# Patient Record
Sex: MALE | Race: WHITE | NOT HISPANIC OR LATINO
[De-identification: names, ages, dates, MRNs, and addresses within clinical notes are randomized per-mention and may not be internally consistent; named-entity substitution may affect disease eponyms.]

---

## 2017-03-20 ENCOUNTER — RESULT REVIEW (OUTPATIENT)
Age: 82
End: 2017-03-20

## 2017-03-20 VITALS
RESPIRATION RATE: 22 BRPM | HEART RATE: 82 BPM | SYSTOLIC BLOOD PRESSURE: 140 MMHG | DIASTOLIC BLOOD PRESSURE: 56 MMHG | OXYGEN SATURATION: 97 %

## 2017-03-20 RX ORDER — ACETAMINOPHEN 500 MG
1000 TABLET ORAL ONCE
Qty: 0 | Refills: 0 | Status: COMPLETED | OUTPATIENT
Start: 2017-03-20 | End: 2017-03-20

## 2017-03-20 RX ADMIN — Medication 400 MILLIGRAM(S): at 23:51

## 2017-03-20 NOTE — ED PROVIDER NOTE - ATTENDING CONTRIBUTION TO CARE
89 yo male with RLQ abdominal pain x several hours.  No prior appendectomy.  RLQ TTP on my exam.  Will CT abdomen/pelvis, r/o appy and reassess.

## 2017-03-20 NOTE — ED PROVIDER NOTE - OBJECTIVE STATEMENT
89M HLD presents with right lower abdominal pains ("gentle", non-radiating), vomiting, chills after eating whitefish salad. Denies chest pain, shortness of breath, cough, bowel/bladder changes, sick contacts, abd surgeries. No one else ate the salad.

## 2017-03-20 NOTE — ED ADULT NURSE NOTE - OBJECTIVE STATEMENT
Patient presented to ED via ambulance from home c/o RLQ abdominal pain and vomiting x 2 , Patient was not feeling good for 2/3 days and tonight at 2030 pm patient vomited. Family at bedside. Patient has some temperature, IV tylenol given as ordered.

## 2017-03-20 NOTE — ED PROVIDER NOTE - INTERPRETATION
EKG reviewed for rate, rhythm, axis, intervals and segments, including QRS morphology, P wave appearance T wave appearance, NH interval, and QT interval.  I find the EKG to be unremarkable in all of these regards except as follows: L axis, PACs

## 2017-03-20 NOTE — ED PROVIDER NOTE - MEDICAL DECISION MAKING DETAILS
89M presents with RLQ pain concerning for possible appy vs gastroenteritis. Ate salad that may have caused the illness, but tender in the RLQ. Will obtain blood, urine, ecg, ctap, cxr. Ben Saldana, Resident.

## 2017-03-21 ENCOUNTER — TRANSCRIPTION ENCOUNTER (OUTPATIENT)
Age: 82
End: 2017-03-21

## 2017-03-21 ENCOUNTER — OUTPATIENT (OUTPATIENT)
Dept: EMERGENCY DEPT | Facility: HOSPITAL | Age: 82
LOS: 1 days | Discharge: HOME | End: 2017-03-21
Payer: MEDICARE

## 2017-03-21 VITALS
OXYGEN SATURATION: 98 % | DIASTOLIC BLOOD PRESSURE: 59 MMHG | HEART RATE: 84 BPM | RESPIRATION RATE: 16 BRPM | SYSTOLIC BLOOD PRESSURE: 111 MMHG

## 2017-03-21 DIAGNOSIS — R10.31 RIGHT LOWER QUADRANT PAIN: ICD-10-CM

## 2017-03-21 DIAGNOSIS — Z90.89 ACQUIRED ABSENCE OF OTHER ORGANS: Chronic | ICD-10-CM

## 2017-03-21 DIAGNOSIS — K35.80 UNSPECIFIED ACUTE APPENDICITIS: ICD-10-CM

## 2017-03-21 LAB
BLD GP AB SCN SERPL QL: NEGATIVE — SIGNIFICANT CHANGE UP
RH IG SCN BLD-IMP: POSITIVE — SIGNIFICANT CHANGE UP

## 2017-03-21 PROCEDURE — 84484 ASSAY OF TROPONIN QUANT: CPT

## 2017-03-21 PROCEDURE — 84295 ASSAY OF SERUM SODIUM: CPT

## 2017-03-21 PROCEDURE — 86850 RBC ANTIBODY SCREEN: CPT

## 2017-03-21 PROCEDURE — 84100 ASSAY OF PHOSPHORUS: CPT

## 2017-03-21 PROCEDURE — 81001 URINALYSIS AUTO W/SCOPE: CPT

## 2017-03-21 PROCEDURE — 80053 COMPREHEN METABOLIC PANEL: CPT

## 2017-03-21 PROCEDURE — 80048 BASIC METABOLIC PNL TOTAL CA: CPT

## 2017-03-21 PROCEDURE — 85014 HEMATOCRIT: CPT

## 2017-03-21 PROCEDURE — 87040 BLOOD CULTURE FOR BACTERIA: CPT

## 2017-03-21 PROCEDURE — 71010: CPT | Mod: 26

## 2017-03-21 PROCEDURE — 74177 CT ABD & PELVIS W/CONTRAST: CPT | Mod: 26

## 2017-03-21 PROCEDURE — 82803 BLOOD GASES ANY COMBINATION: CPT

## 2017-03-21 PROCEDURE — 85730 THROMBOPLASTIN TIME PARTIAL: CPT

## 2017-03-21 PROCEDURE — 88304 TISSUE EXAM BY PATHOLOGIST: CPT

## 2017-03-21 PROCEDURE — 85610 PROTHROMBIN TIME: CPT

## 2017-03-21 PROCEDURE — 87086 URINE CULTURE/COLONY COUNT: CPT

## 2017-03-21 PROCEDURE — 74177 CT ABD & PELVIS W/CONTRAST: CPT

## 2017-03-21 PROCEDURE — 88304 TISSUE EXAM BY PATHOLOGIST: CPT | Mod: 26

## 2017-03-21 PROCEDURE — 83605 ASSAY OF LACTIC ACID: CPT

## 2017-03-21 PROCEDURE — 86901 BLOOD TYPING SEROLOGIC RH(D): CPT

## 2017-03-21 PROCEDURE — 83735 ASSAY OF MAGNESIUM: CPT

## 2017-03-21 PROCEDURE — 82947 ASSAY GLUCOSE BLOOD QUANT: CPT

## 2017-03-21 PROCEDURE — 71045 X-RAY EXAM CHEST 1 VIEW: CPT

## 2017-03-21 PROCEDURE — 85027 COMPLETE CBC AUTOMATED: CPT

## 2017-03-21 PROCEDURE — 83690 ASSAY OF LIPASE: CPT

## 2017-03-21 PROCEDURE — 82435 ASSAY OF BLOOD CHLORIDE: CPT

## 2017-03-21 PROCEDURE — 82330 ASSAY OF CALCIUM: CPT

## 2017-03-21 PROCEDURE — 44970 LAPAROSCOPY APPENDECTOMY: CPT

## 2017-03-21 PROCEDURE — 86900 BLOOD TYPING SEROLOGIC ABO: CPT

## 2017-03-21 PROCEDURE — 84132 ASSAY OF SERUM POTASSIUM: CPT

## 2017-03-21 RX ORDER — HYDROMORPHONE HYDROCHLORIDE 2 MG/ML
0.5 INJECTION INTRAMUSCULAR; INTRAVENOUS; SUBCUTANEOUS EVERY 6 HOURS
Qty: 0 | Refills: 0 | Status: DISCONTINUED | OUTPATIENT
Start: 2017-03-21 | End: 2017-03-21

## 2017-03-21 RX ORDER — HYDROMORPHONE HYDROCHLORIDE 2 MG/ML
1 INJECTION INTRAMUSCULAR; INTRAVENOUS; SUBCUTANEOUS EVERY 8 HOURS
Qty: 0 | Refills: 0 | Status: DISCONTINUED | OUTPATIENT
Start: 2017-03-21 | End: 2017-03-21

## 2017-03-21 RX ORDER — SODIUM CHLORIDE 9 MG/ML
1000 INJECTION, SOLUTION INTRAVENOUS
Qty: 0 | Refills: 0 | Status: DISCONTINUED | OUTPATIENT
Start: 2017-03-21 | End: 2017-03-21

## 2017-03-21 RX ORDER — ACETAMINOPHEN 500 MG
650 TABLET ORAL EVERY 6 HOURS
Qty: 0 | Refills: 0 | Status: DISCONTINUED | OUTPATIENT
Start: 2017-03-21 | End: 2017-03-21

## 2017-03-21 RX ORDER — ENOXAPARIN SODIUM 100 MG/ML
30 INJECTION SUBCUTANEOUS DAILY
Qty: 0 | Refills: 0 | Status: DISCONTINUED | OUTPATIENT
Start: 2017-03-21 | End: 2017-03-21

## 2017-03-21 RX ORDER — ACETAMINOPHEN 500 MG
2 TABLET ORAL
Qty: 0 | Refills: 0 | DISCHARGE
Start: 2017-03-21

## 2017-03-21 RX ORDER — FENTANYL CITRATE 50 UG/ML
25 INJECTION INTRAVENOUS
Qty: 0 | Refills: 0 | Status: DISCONTINUED | OUTPATIENT
Start: 2017-03-21 | End: 2017-03-21

## 2017-03-21 RX ORDER — CEFOTETAN DISODIUM 1 G
2 VIAL (EA) INJECTION ONCE
Qty: 0 | Refills: 0 | Status: COMPLETED | OUTPATIENT
Start: 2017-03-21 | End: 2017-03-21

## 2017-03-21 RX ORDER — SODIUM CHLORIDE 9 MG/ML
1000 INJECTION INTRAMUSCULAR; INTRAVENOUS; SUBCUTANEOUS ONCE
Qty: 0 | Refills: 0 | Status: COMPLETED | OUTPATIENT
Start: 2017-03-21 | End: 2017-03-21

## 2017-03-21 RX ORDER — CEFOTETAN DISODIUM 1 G
2 VIAL (EA) INJECTION EVERY 12 HOURS
Qty: 0 | Refills: 0 | Status: DISCONTINUED | OUTPATIENT
Start: 2017-03-21 | End: 2017-03-21

## 2017-03-21 RX ORDER — ATORVASTATIN CALCIUM 80 MG/1
1 TABLET, FILM COATED ORAL
Qty: 0 | Refills: 0 | DISCHARGE
Start: 2017-03-21

## 2017-03-21 RX ORDER — SERTRALINE 25 MG/1
1 TABLET, FILM COATED ORAL
Qty: 0 | Refills: 0 | DISCHARGE
Start: 2017-03-21

## 2017-03-21 RX ORDER — ONDANSETRON 8 MG/1
4 TABLET, FILM COATED ORAL ONCE
Qty: 0 | Refills: 0 | Status: DISCONTINUED | OUTPATIENT
Start: 2017-03-21 | End: 2017-03-21

## 2017-03-21 RX ADMIN — SODIUM CHLORIDE 1000 MILLILITER(S): 9 INJECTION INTRAMUSCULAR; INTRAVENOUS; SUBCUTANEOUS at 05:10

## 2017-03-21 RX ADMIN — Medication 100 GRAM(S): at 04:29

## 2017-03-21 RX ADMIN — Medication 1000 MILLIGRAM(S): at 01:32

## 2017-03-21 RX ADMIN — ENOXAPARIN SODIUM 30 MILLIGRAM(S): 100 INJECTION SUBCUTANEOUS at 13:51

## 2017-03-21 RX ADMIN — SODIUM CHLORIDE 50 MILLILITER(S): 9 INJECTION, SOLUTION INTRAVENOUS at 11:05

## 2017-03-21 RX ADMIN — SODIUM CHLORIDE 2000 MILLILITER(S): 9 INJECTION INTRAMUSCULAR; INTRAVENOUS; SUBCUTANEOUS at 02:09

## 2017-03-21 RX ADMIN — SODIUM CHLORIDE 2000 MILLILITER(S): 9 INJECTION INTRAMUSCULAR; INTRAVENOUS; SUBCUTANEOUS at 01:32

## 2017-03-21 NOTE — DISCHARGE NOTE ADULT - PLAN OF CARE
post op care regular diet as tolerated, no heavy lifting or straining, may shower Please pat steri strips dry after showering, follow up with Dr. Early in 1 week     notify Dr. Early if develop fever, chills, nausea, vomiting increased abdominal pain

## 2017-03-21 NOTE — DISCHARGE NOTE ADULT - MEDICATION SUMMARY - MEDICATIONS TO TAKE
I will START or STAY ON the medications listed below when I get home from the hospital:    acetaminophen 325 mg oral tablet  -- 2 tab(s) by mouth every 6 hours, As needed, Mild Pain (1 - 3)  -- Indication: For post op pain     sertraline 50 mg oral tablet  -- 1 tab(s) by mouth once a day  -- Indication: For depression     atorvastatin 10 mg oral tablet  -- 1 tab(s) by mouth once a day  -- Indication: For high cholesterol

## 2017-03-21 NOTE — DISCHARGE NOTE ADULT - CARE PROVIDER_API CALL
Ben Early), Surgery; Surgical Critical Care  [address] [place] [id number]  Phone: [phone]  Fax: [phone]

## 2017-03-21 NOTE — DISCHARGE NOTE ADULT - CARE PROVIDERS DIRECT ADDRESSES
[email or web address]

## 2017-03-21 NOTE — DISCHARGE NOTE ADULT - NS AS ACTIVITY OBS
Showering allowed/Do not make important decisions/No Heavy lifting/straining/Do not drive or operate machinery

## 2017-03-21 NOTE — DISCHARGE NOTE ADULT - CARE PLAN
Principal Discharge DX:	Acute appendicitis, unspecified acute appendicitis type  Goal:	post op care  Instructions for follow-up, activity and diet:	regular diet as tolerated, no heavy lifting or straining, may shower Please pat steri strips dry after showering, follow up with Dr. Early in 1 week     notify Dr. Early if develop fever, chills, nausea, vomiting increased abdominal pain

## 2017-03-21 NOTE — DISCHARGE NOTE ADULT - PATIENT PORTAL LINK FT
“You can access the [facility] Patient Portal, offered by [facility], by registering with the following website: [email or web address]”

## 2017-03-21 NOTE — ED ADULT NURSE REASSESSMENT NOTE - NS ED NURSE REASSESS COMMENT FT1
Ct scan + appendicitis. T&S obtained and sent. Blood cultures sent. Patient voided twice, small amounts. Patient calm A+Ox3. skin intact.

## 2017-03-21 NOTE — H&P ADULT. - HISTORY OF PRESENT ILLNESS
89M with 1 day of abdominal pain. His pain is in RLQ, associated with emesis and chills. Pain came on around 11pm. No fever, obstructive symptoms. He initially thought he had gastritis or food poisoning from eating something bad. Anorexia. His initial SBP was 140 but went down to SBP 80, which responded to IVF and went back up to 100's. Afebrile. He is tender in suprapubic and RLQ area with focal guarding. He doesn't know the doses of his medications but completely AOx3.

## 2017-03-21 NOTE — BRIEF OPERATIVE NOTE - OPERATION/FINDINGS
Procedure: Laparoscopic appendectomy    Findings: A supraumbilical incision was made and carried down through the fascia. The abdomen was entered and insufflated. Additional trocars were placed in the RUQ and LLQ. The mesoappendix was ligated and the appendix was stapled off and removed. The fascia and skin was then closed.

## 2017-03-21 NOTE — DISCHARGE NOTE ADULT - HOSPITAL COURSE
89M with 1 day of abdominal pain. His pain is in RLQ, associated with emesis and chills. Pain came on around 11pm. No fever, obstructive symptoms. He initially thought he had gastritis or food poisoning from eating something bad. Anorexia. His initial SBP was 140 but went down to SBP 80, which responded to IVF and went back up to 100's. Afebrile. He is tender in suprapubic and RLQ area with focal guarding. 89M with 1 day of abdominal pain. His pain is in RLQ, associated with emesis and chills. Pain came on around 11pm. No fever, obstructive symptoms. He initially thought he had gastritis or food poisoning from eating something bad. Anorexia. His initial SBP was 140 but went down to SBP 80, which responded to IVF and went back up to 100's. Afebrile. He is tender in suprapubic and RLQ area with focal guarding.  CT Acute appendicitis. No periappendiceal abscess or evidence of perforation. pt taken to operating room underwent lap appendectomy. Post op did well Pt ambulating, voiding, tolerating diet on discharge to follow up Dr. Eraly 89M with 1 day of abdominal pain. His pain is in RLQ, associated with emesis and chills. Pain came on around 11pm. No fever, obstructive symptoms. He initially thought he had gastritis or food poisoning from eating something bad. Anorexia. His initial SBP was 140 but went down to SBP 80, which responded to IVF and went back up to 100's. Afebrile. He is tender in suprapubic and RLQ area with focal guarding.  CT Acute appendicitis. No periappendiceal abscess or evidence of perforation. pt taken to operating room underwent lap appendectomy. Post op did well Pt ambulating, voiding, tolerating diet on discharge to follow up Dr. Eraly. pt does not want to take any narcotics and wishes just to take tylenol for pain

## 2017-03-24 RX ORDER — SERTRALINE 25 MG/1
0 TABLET, FILM COATED ORAL
Qty: 0 | Refills: 0 | COMMUNITY

## 2017-03-24 RX ORDER — ATORVASTATIN CALCIUM 80 MG/1
0 TABLET, FILM COATED ORAL
Qty: 0 | Refills: 0 | COMMUNITY

## 2017-03-28 PROBLEM — F41.9 ANXIETY DISORDER, UNSPECIFIED: Chronic | Status: ACTIVE | Noted: 2017-03-21

## 2017-03-30 ENCOUNTER — APPOINTMENT (OUTPATIENT)
Dept: TRAUMA SURGERY | Facility: CLINIC | Age: 82
End: 2017-03-30

## 2017-04-06 ENCOUNTER — APPOINTMENT (OUTPATIENT)
Dept: TRAUMA SURGERY | Facility: CLINIC | Age: 82
End: 2017-04-06

## 2017-04-06 VITALS
HEIGHT: 61 IN | TEMPERATURE: 98 F | HEART RATE: 111 BPM | SYSTOLIC BLOOD PRESSURE: 109 MMHG | WEIGHT: 125 LBS | BODY MASS INDEX: 23.6 KG/M2 | DIASTOLIC BLOOD PRESSURE: 67 MMHG

## 2018-02-12 ENCOUNTER — APPOINTMENT (OUTPATIENT)
Dept: GERIATRICS | Facility: CLINIC | Age: 83
End: 2018-02-12
Payer: MEDICARE

## 2018-02-12 ENCOUNTER — LABORATORY RESULT (OUTPATIENT)
Age: 83
End: 2018-02-12

## 2018-02-12 VITALS
HEART RATE: 96 BPM | WEIGHT: 129 LBS | TEMPERATURE: 98.3 F | HEIGHT: 61 IN | DIASTOLIC BLOOD PRESSURE: 60 MMHG | BODY MASS INDEX: 24.35 KG/M2 | SYSTOLIC BLOOD PRESSURE: 108 MMHG | OXYGEN SATURATION: 97 %

## 2018-02-12 DIAGNOSIS — E78.5 HYPERLIPIDEMIA, UNSPECIFIED: ICD-10-CM

## 2018-02-12 DIAGNOSIS — Z00.00 ENCOUNTER FOR GENERAL ADULT MEDICAL EXAMINATION W/OUT ABNORMAL FINDINGS: ICD-10-CM

## 2018-02-12 DIAGNOSIS — Z86.59 PERSONAL HISTORY OF OTHER MENTAL AND BEHAVIORAL DISORDERS: ICD-10-CM

## 2018-02-12 LAB
ALBUMIN SERPL ELPH-MCNC: 4.3 G/DL
ALP BLD-CCNC: 98 U/L
ALT SERPL-CCNC: 16 U/L
ANION GAP SERPL CALC-SCNC: 13 MMOL/L
AST SERPL-CCNC: 20 U/L
BASOPHILS # BLD AUTO: 0.01 K/UL
BASOPHILS NFR BLD AUTO: 0.2 %
BILIRUB SERPL-MCNC: 0.5 MG/DL
BUN SERPL-MCNC: 23 MG/DL
CALCIUM SERPL-MCNC: 9.2 MG/DL
CHLORIDE SERPL-SCNC: 103 MMOL/L
CHOLEST SERPL-MCNC: 142 MG/DL
CHOLEST/HDLC SERPL: 3.5 RATIO
CO2 SERPL-SCNC: 26 MMOL/L
CREAT SERPL-MCNC: 0.88 MG/DL
EOSINOPHIL # BLD AUTO: 0.07 K/UL
EOSINOPHIL NFR BLD AUTO: 1.1 %
GLUCOSE SERPL-MCNC: 103 MG/DL
HCT VFR BLD CALC: 39.6 %
HDLC SERPL-MCNC: 41 MG/DL
HGB BLD-MCNC: 13 G/DL
IMM GRANULOCYTES NFR BLD AUTO: 0.2 %
LDLC SERPL CALC-MCNC: 57 MG/DL
LYMPHOCYTES # BLD AUTO: 1.07 K/UL
LYMPHOCYTES NFR BLD AUTO: 17.5 %
MAN DIFF?: NORMAL
MCHC RBC-ENTMCNC: 31.1 PG
MCHC RBC-ENTMCNC: 32.8 GM/DL
MCV RBC AUTO: 94.7 FL
MONOCYTES # BLD AUTO: 0.86 K/UL
MONOCYTES NFR BLD AUTO: 14.1 %
NEUTROPHILS # BLD AUTO: 4.09 K/UL
NEUTROPHILS NFR BLD AUTO: 66.9 %
PLATELET # BLD AUTO: 221 K/UL
POTASSIUM SERPL-SCNC: 4.5 MMOL/L
PROT SERPL-MCNC: 6.9 G/DL
RBC # BLD: 4.18 M/UL
RBC # FLD: 13.2 %
SODIUM SERPL-SCNC: 142 MMOL/L
TRIGL SERPL-MCNC: 221 MG/DL
WBC # FLD AUTO: 6.11 K/UL

## 2018-02-12 PROCEDURE — 99204 OFFICE O/P NEW MOD 45 MIN: CPT | Mod: 25

## 2018-02-12 PROCEDURE — 99497 ADVNCD CARE PLAN 30 MIN: CPT

## 2018-02-13 DIAGNOSIS — Z85.46 PERSONAL HISTORY OF MALIGNANT NEOPLASM OF PROSTATE: ICD-10-CM

## 2018-02-13 DIAGNOSIS — N32.81 OVERACTIVE BLADDER: ICD-10-CM

## 2018-02-13 DIAGNOSIS — Z85.828 PERSONAL HISTORY OF OTHER MALIGNANT NEOPLASM OF SKIN: ICD-10-CM

## 2018-02-13 DIAGNOSIS — H91.93 UNSPECIFIED HEARING LOSS, BILATERAL: ICD-10-CM

## 2018-02-13 LAB
25(OH)D3 SERPL-MCNC: 40.2 NG/ML
FOLATE SERPL-MCNC: >20 NG/ML
TSH SERPL-ACNC: 5.24 UIU/ML
VIT B12 SERPL-MCNC: 924 PG/ML

## 2018-03-02 PROBLEM — H91.93 BILATERAL HEARING LOSS, UNSPECIFIED HEARING LOSS TYPE: Status: ACTIVE | Noted: 2018-03-02

## 2018-03-02 PROBLEM — Z85.828 HISTORY OF MALIGNANT NEOPLASM OF SKIN: Status: RESOLVED | Noted: 2018-03-02 | Resolved: 2018-03-02

## 2018-03-02 PROBLEM — N32.81 OVERACTIVE BLADDER: Status: ACTIVE | Noted: 2018-03-02

## 2018-03-02 PROBLEM — Z85.46 HISTORY OF MALIGNANT NEOPLASM OF PROSTATE: Status: RESOLVED | Noted: 2018-03-02 | Resolved: 2018-03-02

## 2018-03-26 ENCOUNTER — APPOINTMENT (OUTPATIENT)
Dept: RADIOLOGY | Facility: HOSPITAL | Age: 83
End: 2018-03-26
Payer: MEDICARE

## 2018-03-26 ENCOUNTER — OUTPATIENT (OUTPATIENT)
Dept: OUTPATIENT SERVICES | Facility: HOSPITAL | Age: 83
LOS: 1 days | End: 2018-03-26

## 2018-03-26 DIAGNOSIS — Z90.89 ACQUIRED ABSENCE OF OTHER ORGANS: Chronic | ICD-10-CM

## 2018-03-26 DIAGNOSIS — R13.10 DYSPHAGIA, UNSPECIFIED: ICD-10-CM

## 2018-03-26 PROCEDURE — 74220 X-RAY XM ESOPHAGUS 1CNTRST: CPT | Mod: 26

## 2018-12-26 ENCOUNTER — APPOINTMENT (OUTPATIENT)
Dept: GERIATRICS | Facility: CLINIC | Age: 83
End: 2018-12-26
Payer: MEDICARE

## 2018-12-26 ENCOUNTER — LABORATORY RESULT (OUTPATIENT)
Age: 83
End: 2018-12-26

## 2018-12-26 VITALS
BODY MASS INDEX: 26.08 KG/M2 | OXYGEN SATURATION: 98 % | TEMPERATURE: 98.1 F | WEIGHT: 138 LBS | HEART RATE: 82 BPM | DIASTOLIC BLOOD PRESSURE: 50 MMHG | SYSTOLIC BLOOD PRESSURE: 100 MMHG

## 2018-12-26 DIAGNOSIS — Z09 ENCOUNTER FOR FOLLOW-UP EXAMINATION AFTER COMPLETED TREATMENT FOR CONDITIONS OTHER THAN MALIGNANT NEOPLASM: ICD-10-CM

## 2018-12-26 PROCEDURE — G0008: CPT

## 2018-12-26 PROCEDURE — 90662 IIV NO PRSV INCREASED AG IM: CPT

## 2018-12-26 PROCEDURE — 99215 OFFICE O/P EST HI 40 MIN: CPT | Mod: 25

## 2018-12-26 RX ORDER — MULTIVIT-MIN/FOLIC/VIT K/LYCOP 400-300MCG
25 MCG TABLET ORAL DAILY
Qty: 30 | Refills: 3 | Status: DISCONTINUED | OUTPATIENT
Start: 2018-02-12 | End: 2018-12-26

## 2018-12-27 LAB
ALBUMIN SERPL ELPH-MCNC: 4.1 G/DL
ALP BLD-CCNC: 91 U/L
ALT SERPL-CCNC: 14 U/L
ANION GAP SERPL CALC-SCNC: 10 MMOL/L
AST SERPL-CCNC: 20 U/L
BILIRUB SERPL-MCNC: 0.4 MG/DL
BUN SERPL-MCNC: 26 MG/DL
CALCIUM SERPL-MCNC: 9.3 MG/DL
CHLORIDE SERPL-SCNC: 105 MMOL/L
CHOLEST SERPL-MCNC: 138 MG/DL
CHOLEST/HDLC SERPL: 3.4 RATIO
CO2 SERPL-SCNC: 26 MMOL/L
CREAT SERPL-MCNC: 1.07 MG/DL
GLUCOSE SERPL-MCNC: 116 MG/DL
HDLC SERPL-MCNC: 41 MG/DL
LDLC SERPL CALC-MCNC: 51 MG/DL
POTASSIUM SERPL-SCNC: 4.2 MMOL/L
PROT SERPL-MCNC: 6.7 G/DL
SODIUM SERPL-SCNC: 141 MMOL/L
TRIGL SERPL-MCNC: 230 MG/DL
TSH SERPL-ACNC: 5.74 UIU/ML

## 2019-01-01 ENCOUNTER — TRANSCRIPTION ENCOUNTER (OUTPATIENT)
Age: 84
End: 2019-01-01

## 2019-01-01 ENCOUNTER — RESULT REVIEW (OUTPATIENT)
Age: 84
End: 2019-01-01

## 2019-01-01 ENCOUNTER — LABORATORY RESULT (OUTPATIENT)
Age: 84
End: 2019-01-01

## 2019-01-01 ENCOUNTER — INPATIENT (INPATIENT)
Facility: HOSPITAL | Age: 84
LOS: 1 days | Discharge: HOME | DRG: 54 | End: 2019-09-01
Attending: HOSPITALIST | Admitting: HOSPITALIST
Payer: MEDICARE

## 2019-01-01 ENCOUNTER — APPOINTMENT (OUTPATIENT)
Dept: INFUSION THERAPY | Facility: HOSPITAL | Age: 84
End: 2019-01-01

## 2019-01-01 ENCOUNTER — APPOINTMENT (OUTPATIENT)
Dept: NEUROLOGY | Facility: CLINIC | Age: 84
End: 2019-01-01
Payer: MEDICARE

## 2019-01-01 ENCOUNTER — APPOINTMENT (OUTPATIENT)
Dept: GERIATRICS | Facility: CLINIC | Age: 84
End: 2019-01-01
Payer: MEDICARE

## 2019-01-01 ENCOUNTER — RX CHANGE (OUTPATIENT)
Age: 84
End: 2019-01-01

## 2019-01-01 ENCOUNTER — OUTPATIENT (OUTPATIENT)
Dept: OUTPATIENT SERVICES | Facility: HOSPITAL | Age: 84
LOS: 1 days | Discharge: HOME | End: 2019-01-01

## 2019-01-01 ENCOUNTER — APPOINTMENT (OUTPATIENT)
Dept: SPINE | Facility: CLINIC | Age: 84
End: 2019-01-01
Payer: MEDICARE

## 2019-01-01 ENCOUNTER — MESSAGE (OUTPATIENT)
Age: 84
End: 2019-01-01

## 2019-01-01 ENCOUNTER — APPOINTMENT (OUTPATIENT)
Dept: HEMATOLOGY ONCOLOGY | Facility: CLINIC | Age: 84
End: 2019-01-01

## 2019-01-01 ENCOUNTER — EMERGENCY (EMERGENCY)
Facility: HOSPITAL | Age: 84
LOS: 1 days | Discharge: HOME | End: 2019-01-01
Attending: EMERGENCY MEDICINE
Payer: MEDICARE

## 2019-01-01 ENCOUNTER — APPOINTMENT (OUTPATIENT)
Dept: MRI IMAGING | Facility: IMAGING CENTER | Age: 84
End: 2019-01-01
Payer: MEDICARE

## 2019-01-01 ENCOUNTER — OUTPATIENT (OUTPATIENT)
Dept: OUTPATIENT SERVICES | Facility: HOSPITAL | Age: 84
LOS: 1 days | End: 2019-01-01
Payer: MEDICARE

## 2019-01-01 VITALS
DIASTOLIC BLOOD PRESSURE: 60 MMHG | SYSTOLIC BLOOD PRESSURE: 100 MMHG | TEMPERATURE: 97.4 F | RESPIRATION RATE: 14 BRPM | HEIGHT: 61 IN | BODY MASS INDEX: 24 KG/M2 | OXYGEN SATURATION: 98 % | WEIGHT: 127.13 LBS | HEART RATE: 84 BPM

## 2019-01-01 VITALS
RESPIRATION RATE: 16 BRPM | SYSTOLIC BLOOD PRESSURE: 144 MMHG | HEART RATE: 103 BPM | DIASTOLIC BLOOD PRESSURE: 87 MMHG | OXYGEN SATURATION: 100 %

## 2019-01-01 VITALS
DIASTOLIC BLOOD PRESSURE: 54 MMHG | TEMPERATURE: 97.7 F | WEIGHT: 130.2 LBS | HEART RATE: 80 BPM | BODY MASS INDEX: 24.6 KG/M2 | SYSTOLIC BLOOD PRESSURE: 112 MMHG | OXYGEN SATURATION: 98 %

## 2019-01-01 VITALS
HEART RATE: 72 BPM | TEMPERATURE: 98 F | WEIGHT: 119.93 LBS | RESPIRATION RATE: 18 BRPM | SYSTOLIC BLOOD PRESSURE: 113 MMHG | DIASTOLIC BLOOD PRESSURE: 68 MMHG | OXYGEN SATURATION: 97 %

## 2019-01-01 VITALS
SYSTOLIC BLOOD PRESSURE: 135 MMHG | OXYGEN SATURATION: 99 % | TEMPERATURE: 98 F | RESPIRATION RATE: 18 BRPM | HEART RATE: 98 BPM | DIASTOLIC BLOOD PRESSURE: 75 MMHG

## 2019-01-01 VITALS
TEMPERATURE: 97.8 F | BODY MASS INDEX: 23.88 KG/M2 | SYSTOLIC BLOOD PRESSURE: 102 MMHG | DIASTOLIC BLOOD PRESSURE: 58 MMHG | OXYGEN SATURATION: 96 % | RESPIRATION RATE: 15 BRPM | HEART RATE: 79 BPM | WEIGHT: 126.4 LBS

## 2019-01-01 VITALS
TEMPERATURE: 97.7 F | DIASTOLIC BLOOD PRESSURE: 70 MMHG | HEIGHT: 61 IN | RESPIRATION RATE: 14 BRPM | WEIGHT: 127 LBS | SYSTOLIC BLOOD PRESSURE: 90 MMHG | HEART RATE: 73 BPM | OXYGEN SATURATION: 98 % | BODY MASS INDEX: 23.98 KG/M2

## 2019-01-01 VITALS
WEIGHT: 126 LBS | HEIGHT: 61 IN | TEMPERATURE: 98 F | HEART RATE: 67 BPM | BODY MASS INDEX: 23.79 KG/M2 | RESPIRATION RATE: 15 BRPM | SYSTOLIC BLOOD PRESSURE: 110 MMHG | OXYGEN SATURATION: 98 % | DIASTOLIC BLOOD PRESSURE: 56 MMHG

## 2019-01-01 VITALS
WEIGHT: 126 LBS | DIASTOLIC BLOOD PRESSURE: 62 MMHG | HEIGHT: 61 IN | RESPIRATION RATE: 16 BRPM | OXYGEN SATURATION: 96 % | HEART RATE: 83 BPM | SYSTOLIC BLOOD PRESSURE: 100 MMHG | BODY MASS INDEX: 23.79 KG/M2

## 2019-01-01 VITALS
DIASTOLIC BLOOD PRESSURE: 69 MMHG | OXYGEN SATURATION: 98 % | SYSTOLIC BLOOD PRESSURE: 116 MMHG | HEART RATE: 79 BPM | RESPIRATION RATE: 18 BRPM | TEMPERATURE: 97 F

## 2019-01-01 VITALS — OXYGEN SATURATION: 98 % | HEART RATE: 94 BPM | RESPIRATION RATE: 16 BRPM

## 2019-01-01 DIAGNOSIS — G89.29 LUMBAGO WITH SCIATICA, LEFT SIDE: ICD-10-CM

## 2019-01-01 DIAGNOSIS — C71.9 MALIGNANT NEOPLASM OF BRAIN, UNSPECIFIED: ICD-10-CM

## 2019-01-01 DIAGNOSIS — Z13.21 ENCOUNTER FOR SCREENING FOR NUTRITIONAL DISORDER: ICD-10-CM

## 2019-01-01 DIAGNOSIS — R41.0 DISORIENTATION, UNSPECIFIED: ICD-10-CM

## 2019-01-01 DIAGNOSIS — Z90.89 ACQUIRED ABSENCE OF OTHER ORGANS: Chronic | ICD-10-CM

## 2019-01-01 DIAGNOSIS — K59.00 CONSTIPATION, UNSPECIFIED: ICD-10-CM

## 2019-01-01 DIAGNOSIS — F41.9 ANXIETY DISORDER, UNSPECIFIED: ICD-10-CM

## 2019-01-01 DIAGNOSIS — F32.9 MAJOR DEPRESSIVE DISORDER, SINGLE EPISODE, UNSPECIFIED: ICD-10-CM

## 2019-01-01 DIAGNOSIS — Z90.49 ACQUIRED ABSENCE OF OTHER SPECIFIED PARTS OF DIGESTIVE TRACT: Chronic | ICD-10-CM

## 2019-01-01 DIAGNOSIS — D64.9 ANEMIA, UNSPECIFIED: ICD-10-CM

## 2019-01-01 DIAGNOSIS — Z23 ENCOUNTER FOR IMMUNIZATION: ICD-10-CM

## 2019-01-01 DIAGNOSIS — Z29.9 ENCOUNTER FOR PROPHYLACTIC MEASURES, UNSPECIFIED: ICD-10-CM

## 2019-01-01 DIAGNOSIS — G93.89 OTHER SPECIFIED DISORDERS OF BRAIN: ICD-10-CM

## 2019-01-01 DIAGNOSIS — Z51.11 ENCOUNTER FOR ANTINEOPLASTIC CHEMOTHERAPY: ICD-10-CM

## 2019-01-01 DIAGNOSIS — R26.89 OTHER ABNORMALITIES OF GAIT AND MOBILITY: ICD-10-CM

## 2019-01-01 DIAGNOSIS — Z12.83 ENCOUNTER FOR SCREENING FOR MALIGNANT NEOPLASM OF SKIN: ICD-10-CM

## 2019-01-01 DIAGNOSIS — H54.7 UNSPECIFIED VISUAL LOSS: ICD-10-CM

## 2019-01-01 DIAGNOSIS — M54.42 LUMBAGO WITH SCIATICA, LEFT SIDE: ICD-10-CM

## 2019-01-01 DIAGNOSIS — R41.3 OTHER AMNESIA: ICD-10-CM

## 2019-01-01 DIAGNOSIS — Z78.9 OTHER SPECIFIED HEALTH STATUS: ICD-10-CM

## 2019-01-01 DIAGNOSIS — Z91.89 OTHER SPECIFIED PERSONAL RISK FACTORS, NOT ELSEWHERE CLASSIFIED: ICD-10-CM

## 2019-01-01 DIAGNOSIS — R26.81 UNSTEADINESS ON FEET: ICD-10-CM

## 2019-01-01 DIAGNOSIS — M54.16 RADICULOPATHY, LUMBAR REGION: ICD-10-CM

## 2019-01-01 LAB
25(OH)D3 SERPL-MCNC: 33.8 NG/ML
ALBUMIN SERPL ELPH-MCNC: 3.7 G/DL — SIGNIFICANT CHANGE UP (ref 3.3–5)
ALBUMIN SERPL ELPH-MCNC: 3.9 G/DL
ALBUMIN SERPL ELPH-MCNC: 3.9 G/DL — SIGNIFICANT CHANGE UP (ref 3.3–5)
ALBUMIN SERPL ELPH-MCNC: 3.9 G/DL — SIGNIFICANT CHANGE UP (ref 3.3–5)
ALBUMIN SERPL ELPH-MCNC: 4 G/DL
ALBUMIN SERPL ELPH-MCNC: 4.1 G/DL — SIGNIFICANT CHANGE UP (ref 3.3–5)
ALBUMIN SERPL ELPH-MCNC: 4.2 G/DL
ALP BLD-CCNC: 77 U/L
ALP BLD-CCNC: 84 U/L
ALP BLD-CCNC: 94 U/L
ALP SERPL-CCNC: 73 U/L — SIGNIFICANT CHANGE UP (ref 40–120)
ALP SERPL-CCNC: 79 U/L — SIGNIFICANT CHANGE UP (ref 40–120)
ALP SERPL-CCNC: 85 U/L — SIGNIFICANT CHANGE UP (ref 40–120)
ALP SERPL-CCNC: 88 U/L — SIGNIFICANT CHANGE UP (ref 40–120)
ALT FLD-CCNC: 13 U/L — SIGNIFICANT CHANGE UP (ref 10–45)
ALT FLD-CCNC: 16 U/L — SIGNIFICANT CHANGE UP (ref 10–45)
ALT FLD-CCNC: 17 U/L — SIGNIFICANT CHANGE UP (ref 10–45)
ALT FLD-CCNC: 22 U/L — SIGNIFICANT CHANGE UP (ref 10–45)
ALT SERPL-CCNC: 13 U/L
ALT SERPL-CCNC: 16 U/L
ALT SERPL-CCNC: 17 U/L
ANION GAP SERPL CALC-SCNC: 10 MMOL/L
ANION GAP SERPL CALC-SCNC: 10 MMOL/L — SIGNIFICANT CHANGE UP (ref 5–17)
ANION GAP SERPL CALC-SCNC: 10 MMOL/L — SIGNIFICANT CHANGE UP (ref 5–17)
ANION GAP SERPL CALC-SCNC: 11 MMOL/L — SIGNIFICANT CHANGE UP (ref 5–17)
ANION GAP SERPL CALC-SCNC: 12 MMOL/L
ANION GAP SERPL CALC-SCNC: 12 MMOL/L — SIGNIFICANT CHANGE UP (ref 5–17)
ANION GAP SERPL CALC-SCNC: 13 MMOL/L
APPEARANCE UR: CLEAR — SIGNIFICANT CHANGE UP
APPEARANCE UR: CLEAR — SIGNIFICANT CHANGE UP
AST SERPL-CCNC: 15 U/L
AST SERPL-CCNC: 16 U/L — SIGNIFICANT CHANGE UP (ref 10–40)
AST SERPL-CCNC: 17 U/L — SIGNIFICANT CHANGE UP (ref 10–40)
AST SERPL-CCNC: 19 U/L
AST SERPL-CCNC: 19 U/L — SIGNIFICANT CHANGE UP (ref 10–40)
AST SERPL-CCNC: 20 U/L
AST SERPL-CCNC: 36 U/L — SIGNIFICANT CHANGE UP (ref 10–40)
BACTERIA # UR AUTO: NEGATIVE — SIGNIFICANT CHANGE UP
BASOPHILS # BLD AUTO: 0 K/UL — SIGNIFICANT CHANGE UP (ref 0–0.2)
BASOPHILS # BLD AUTO: 0.04 K/UL
BASOPHILS # BLD AUTO: 0.04 K/UL
BASOPHILS # BLD AUTO: 0.05 K/UL
BASOPHILS # BLD AUTO: 0.05 K/UL
BASOPHILS # BLD AUTO: 0.1 K/UL — SIGNIFICANT CHANGE UP (ref 0–0.2)
BASOPHILS # BLD AUTO: 0.1 K/UL — SIGNIFICANT CHANGE UP (ref 0–0.2)
BASOPHILS NFR BLD AUTO: 0 % — SIGNIFICANT CHANGE UP (ref 0–2)
BASOPHILS NFR BLD AUTO: 0 % — SIGNIFICANT CHANGE UP (ref 0–2)
BASOPHILS NFR BLD AUTO: 0.1 % — SIGNIFICANT CHANGE UP (ref 0–2)
BASOPHILS NFR BLD AUTO: 0.2 % — SIGNIFICANT CHANGE UP (ref 0–2)
BASOPHILS NFR BLD AUTO: 0.3 % — SIGNIFICANT CHANGE UP (ref 0–2)
BASOPHILS NFR BLD AUTO: 0.4 % — SIGNIFICANT CHANGE UP (ref 0–2)
BASOPHILS NFR BLD AUTO: 0.4 % — SIGNIFICANT CHANGE UP (ref 0–2)
BASOPHILS NFR BLD AUTO: 0.5 %
BASOPHILS NFR BLD AUTO: 0.6 %
BASOPHILS NFR BLD AUTO: 0.7 %
BASOPHILS NFR BLD AUTO: 0.7 %
BASOPHILS NFR BLD AUTO: 0.7 % — SIGNIFICANT CHANGE UP (ref 0–2)
BASOPHILS NFR BLD AUTO: 0.8 % — SIGNIFICANT CHANGE UP (ref 0–2)
BILIRUB SERPL-MCNC: 0.3 MG/DL — SIGNIFICANT CHANGE UP (ref 0.2–1.2)
BILIRUB SERPL-MCNC: 0.5 MG/DL
BILIRUB SERPL-MCNC: 0.6 MG/DL
BILIRUB SERPL-MCNC: 0.7 MG/DL — SIGNIFICANT CHANGE UP (ref 0.2–1.2)
BILIRUB SERPL-MCNC: 0.8 MG/DL — SIGNIFICANT CHANGE UP (ref 0.2–1.2)
BILIRUB SERPL-MCNC: 0.9 MG/DL — SIGNIFICANT CHANGE UP (ref 0.2–1.2)
BILIRUB SERPL-MCNC: 1 MG/DL
BILIRUB UR-MCNC: NEGATIVE — SIGNIFICANT CHANGE UP
BILIRUB UR-MCNC: NEGATIVE — SIGNIFICANT CHANGE UP
BUN SERPL-MCNC: 19 MG/DL — SIGNIFICANT CHANGE UP (ref 7–23)
BUN SERPL-MCNC: 20 MG/DL
BUN SERPL-MCNC: 20 MG/DL
BUN SERPL-MCNC: 21 MG/DL — SIGNIFICANT CHANGE UP (ref 7–23)
BUN SERPL-MCNC: 22 MG/DL — SIGNIFICANT CHANGE UP (ref 7–23)
BUN SERPL-MCNC: 24 MG/DL
BUN SERPL-MCNC: 25 MG/DL — HIGH (ref 7–23)
BUN SERPL-MCNC: 31 MG/DL — HIGH (ref 7–23)
CA-I BLDA-SCNC: 1.12 MMOL/L — SIGNIFICANT CHANGE UP (ref 1.12–1.3)
CALCIUM SERPL-MCNC: 8.6 MG/DL
CALCIUM SERPL-MCNC: 8.8 MG/DL
CALCIUM SERPL-MCNC: 9 MG/DL — SIGNIFICANT CHANGE UP (ref 8.4–10.5)
CALCIUM SERPL-MCNC: 9.1 MG/DL — SIGNIFICANT CHANGE UP (ref 8.4–10.5)
CALCIUM SERPL-MCNC: 9.3 MG/DL — SIGNIFICANT CHANGE UP (ref 8.4–10.5)
CALCIUM SERPL-MCNC: 9.5 MG/DL
CALCIUM SERPL-MCNC: 9.5 MG/DL — SIGNIFICANT CHANGE UP (ref 8.4–10.5)
CHLORIDE SERPL-SCNC: 102 MMOL/L — SIGNIFICANT CHANGE UP (ref 96–108)
CHLORIDE SERPL-SCNC: 102 MMOL/L — SIGNIFICANT CHANGE UP (ref 96–108)
CHLORIDE SERPL-SCNC: 103 MMOL/L — SIGNIFICANT CHANGE UP (ref 96–108)
CHLORIDE SERPL-SCNC: 104 MMOL/L — SIGNIFICANT CHANGE UP (ref 96–108)
CHLORIDE SERPL-SCNC: 105 MMOL/L
CHLORIDE SERPL-SCNC: 106 MMOL/L
CHLORIDE SERPL-SCNC: 106 MMOL/L
CHLORIDE SERPL-SCNC: 106 MMOL/L — SIGNIFICANT CHANGE UP (ref 96–108)
CHOLEST SERPL-MCNC: 120 MG/DL
CHOLEST/HDLC SERPL: 2.9 RATIO
CO2 SERPL-SCNC: 21 MMOL/L
CO2 SERPL-SCNC: 22 MMOL/L
CO2 SERPL-SCNC: 23 MMOL/L — SIGNIFICANT CHANGE UP (ref 22–31)
CO2 SERPL-SCNC: 25 MMOL/L — SIGNIFICANT CHANGE UP (ref 22–31)
CO2 SERPL-SCNC: 26 MMOL/L
CO2 SERPL-SCNC: 26 MMOL/L — SIGNIFICANT CHANGE UP (ref 22–31)
CO2 SERPL-SCNC: 27 MMOL/L — SIGNIFICANT CHANGE UP (ref 22–31)
CO2 SERPL-SCNC: 29 MMOL/L — SIGNIFICANT CHANGE UP (ref 22–31)
COLOR SPEC: SIGNIFICANT CHANGE UP
COLOR SPEC: SIGNIFICANT CHANGE UP
CREAT SERPL-MCNC: 0.89 MG/DL
CREAT SERPL-MCNC: 0.9 MG/DL
CREAT SERPL-MCNC: 0.93 MG/DL — SIGNIFICANT CHANGE UP (ref 0.5–1.3)
CREAT SERPL-MCNC: 1 MG/DL — SIGNIFICANT CHANGE UP (ref 0.5–1.3)
CREAT SERPL-MCNC: 1.01 MG/DL — SIGNIFICANT CHANGE UP (ref 0.5–1.3)
CREAT SERPL-MCNC: 1.05 MG/DL — SIGNIFICANT CHANGE UP (ref 0.5–1.3)
CREAT SERPL-MCNC: 1.06 MG/DL
CREAT SERPL-MCNC: 1.2 MG/DL — SIGNIFICANT CHANGE UP (ref 0.5–1.3)
CULTURE RESULTS: NO GROWTH — SIGNIFICANT CHANGE UP
CULTURE RESULTS: SIGNIFICANT CHANGE UP
DIFF PNL FLD: NEGATIVE — SIGNIFICANT CHANGE UP
DIFF PNL FLD: NEGATIVE — SIGNIFICANT CHANGE UP
EOSINOPHIL # BLD AUTO: 0 K/UL — SIGNIFICANT CHANGE UP (ref 0–0.5)
EOSINOPHIL # BLD AUTO: 0.04 K/UL
EOSINOPHIL # BLD AUTO: 0.08 K/UL
EOSINOPHIL # BLD AUTO: 0.1 K/UL — SIGNIFICANT CHANGE UP (ref 0–0.5)
EOSINOPHIL # BLD AUTO: 0.2 K/UL — SIGNIFICANT CHANGE UP (ref 0–0.5)
EOSINOPHIL NFR BLD AUTO: 0 % — SIGNIFICANT CHANGE UP (ref 0–6)
EOSINOPHIL NFR BLD AUTO: 0 % — SIGNIFICANT CHANGE UP (ref 0–6)
EOSINOPHIL NFR BLD AUTO: 0.2 % — SIGNIFICANT CHANGE UP (ref 0–6)
EOSINOPHIL NFR BLD AUTO: 0.5 %
EOSINOPHIL NFR BLD AUTO: 0.9 % — SIGNIFICANT CHANGE UP (ref 0–6)
EOSINOPHIL NFR BLD AUTO: 1 % — SIGNIFICANT CHANGE UP (ref 0–6)
EOSINOPHIL NFR BLD AUTO: 1.1 %
EOSINOPHIL NFR BLD AUTO: 1.2 %
EOSINOPHIL NFR BLD AUTO: 1.2 %
EOSINOPHIL NFR BLD AUTO: 1.2 % — SIGNIFICANT CHANGE UP (ref 0–6)
EOSINOPHIL NFR BLD AUTO: 1.5 % — SIGNIFICANT CHANGE UP (ref 0–6)
EOSINOPHIL NFR BLD AUTO: 1.6 % — SIGNIFICANT CHANGE UP (ref 0–6)
EOSINOPHIL NFR BLD AUTO: 1.8 % — SIGNIFICANT CHANGE UP (ref 0–6)
EOSINOPHIL NFR BLD AUTO: 2 % — SIGNIFICANT CHANGE UP (ref 0–6)
EOSINOPHIL NFR BLD AUTO: 2.4 % — SIGNIFICANT CHANGE UP (ref 0–6)
EPI CELLS # UR: 0 /HPF — SIGNIFICANT CHANGE UP
FOLATE SERPL-MCNC: >20 NG/ML
GLUCOSE SERPL-MCNC: 103 MG/DL — HIGH (ref 70–99)
GLUCOSE SERPL-MCNC: 107 MG/DL
GLUCOSE SERPL-MCNC: 112 MG/DL — HIGH (ref 70–99)
GLUCOSE SERPL-MCNC: 114 MG/DL
GLUCOSE SERPL-MCNC: 134 MG/DL — HIGH (ref 70–99)
GLUCOSE SERPL-MCNC: 144 MG/DL — HIGH (ref 70–99)
GLUCOSE SERPL-MCNC: 81 MG/DL
GLUCOSE SERPL-MCNC: 92 MG/DL — SIGNIFICANT CHANGE UP (ref 70–99)
GLUCOSE UR QL: NEGATIVE — SIGNIFICANT CHANGE UP
GLUCOSE UR QL: NEGATIVE — SIGNIFICANT CHANGE UP
HAV IGM SER QL: NONREACTIVE
HBV CORE IGM SER QL: NONREACTIVE
HBV SURFACE AG SER QL: NONREACTIVE
HCT VFR BLD CALC: 37.6 %
HCT VFR BLD CALC: 38.3 % — LOW (ref 39–50)
HCT VFR BLD CALC: 38.4 % — LOW (ref 39–50)
HCT VFR BLD CALC: 38.5 % — LOW (ref 39–50)
HCT VFR BLD CALC: 38.8 % — LOW (ref 39–50)
HCT VFR BLD CALC: 39 % — SIGNIFICANT CHANGE UP (ref 39–50)
HCT VFR BLD CALC: 39 % — SIGNIFICANT CHANGE UP (ref 39–50)
HCT VFR BLD CALC: 39.2 % — SIGNIFICANT CHANGE UP (ref 39–50)
HCT VFR BLD CALC: 39.7 %
HCT VFR BLD CALC: 39.7 %
HCT VFR BLD CALC: 39.8 %
HCT VFR BLD CALC: 40 % — SIGNIFICANT CHANGE UP (ref 39–50)
HCT VFR BLD CALC: 40.1 % — SIGNIFICANT CHANGE UP (ref 39–50)
HCT VFR BLD CALC: 40.3 % — SIGNIFICANT CHANGE UP (ref 39–50)
HCT VFR BLD CALC: 40.3 % — SIGNIFICANT CHANGE UP (ref 39–50)
HCT VFR BLD CALC: 41.5 % — SIGNIFICANT CHANGE UP (ref 39–50)
HCV AB SER QL: NONREACTIVE
HCV S/CO RATIO: 0.13 S/CO
HDLC SERPL-MCNC: 41 MG/DL
HGB BLD-MCNC: 11.9 G/DL
HGB BLD-MCNC: 12.6 G/DL
HGB BLD-MCNC: 12.6 G/DL
HGB BLD-MCNC: 12.7 G/DL
HGB BLD-MCNC: 12.7 G/DL — LOW (ref 13–17)
HGB BLD-MCNC: 12.8 G/DL — LOW (ref 13–17)
HGB BLD-MCNC: 12.8 G/DL — LOW (ref 13–17)
HGB BLD-MCNC: 13 G/DL — SIGNIFICANT CHANGE UP (ref 13–17)
HGB BLD-MCNC: 13.2 G/DL — SIGNIFICANT CHANGE UP (ref 13–17)
HGB BLD-MCNC: 13.2 G/DL — SIGNIFICANT CHANGE UP (ref 13–17)
HGB BLD-MCNC: 13.3 G/DL — SIGNIFICANT CHANGE UP (ref 13–17)
HGB BLD-MCNC: 13.4 G/DL — SIGNIFICANT CHANGE UP (ref 13–17)
HGB BLD-MCNC: 13.4 G/DL — SIGNIFICANT CHANGE UP (ref 13–17)
HGB BLD-MCNC: 13.5 G/DL — SIGNIFICANT CHANGE UP (ref 13–17)
HGB BLD-MCNC: 13.6 G/DL — SIGNIFICANT CHANGE UP (ref 13–17)
HGB BLD-MCNC: 14 G/DL — SIGNIFICANT CHANGE UP (ref 13–17)
HYALINE CASTS # UR AUTO: 0 /LPF — SIGNIFICANT CHANGE UP (ref 0–2)
IMM GRANULOCYTES NFR BLD AUTO: 0.3 %
IMM GRANULOCYTES NFR BLD AUTO: 0.3 %
IMM GRANULOCYTES NFR BLD AUTO: 0.5 %
IMM GRANULOCYTES NFR BLD AUTO: 0.7 %
KETONES UR-MCNC: NEGATIVE — SIGNIFICANT CHANGE UP
KETONES UR-MCNC: NEGATIVE — SIGNIFICANT CHANGE UP
LDLC SERPL CALC-MCNC: 47 MG/DL
LEUKOCYTE ESTERASE UR-ACNC: NEGATIVE — SIGNIFICANT CHANGE UP
LEUKOCYTE ESTERASE UR-ACNC: NEGATIVE — SIGNIFICANT CHANGE UP
LYMPHOCYTES # BLD AUTO: 0.7 K/UL — LOW (ref 1–3.3)
LYMPHOCYTES # BLD AUTO: 0.8 K/UL — LOW (ref 1–3.3)
LYMPHOCYTES # BLD AUTO: 1 K/UL — SIGNIFICANT CHANGE UP (ref 1–3.3)
LYMPHOCYTES # BLD AUTO: 1.05 K/UL
LYMPHOCYTES # BLD AUTO: 1.1 K/UL — SIGNIFICANT CHANGE UP (ref 1–3.3)
LYMPHOCYTES # BLD AUTO: 1.14 K/UL
LYMPHOCYTES # BLD AUTO: 1.2 K/UL — SIGNIFICANT CHANGE UP (ref 1–3.3)
LYMPHOCYTES # BLD AUTO: 1.27 K/UL
LYMPHOCYTES # BLD AUTO: 1.32 K/UL
LYMPHOCYTES # BLD AUTO: 1.4 K/UL — SIGNIFICANT CHANGE UP (ref 1–3.3)
LYMPHOCYTES # BLD AUTO: 1.4 K/UL — SIGNIFICANT CHANGE UP (ref 1–3.3)
LYMPHOCYTES # BLD AUTO: 11.7 % — LOW (ref 13–44)
LYMPHOCYTES # BLD AUTO: 11.9 % — LOW (ref 13–44)
LYMPHOCYTES # BLD AUTO: 15 % — SIGNIFICANT CHANGE UP (ref 13–44)
LYMPHOCYTES # BLD AUTO: 15.2 % — SIGNIFICANT CHANGE UP (ref 13–44)
LYMPHOCYTES # BLD AUTO: 15.9 % — SIGNIFICANT CHANGE UP (ref 13–44)
LYMPHOCYTES # BLD AUTO: 17.1 % — SIGNIFICANT CHANGE UP (ref 13–44)
LYMPHOCYTES # BLD AUTO: 17.6 % — SIGNIFICANT CHANGE UP (ref 13–44)
LYMPHOCYTES # BLD AUTO: 18.7 % — SIGNIFICANT CHANGE UP (ref 13–44)
LYMPHOCYTES # BLD AUTO: 18.8 % — SIGNIFICANT CHANGE UP (ref 13–44)
LYMPHOCYTES # BLD AUTO: 9.3 % — LOW (ref 13–44)
LYMPHOCYTES # BLD AUTO: 9.9 % — LOW (ref 13–44)
LYMPHOCYTES NFR BLD AUTO: 14.4 %
LYMPHOCYTES NFR BLD AUTO: 16.4 %
LYMPHOCYTES NFR BLD AUTO: 19.5 %
LYMPHOCYTES NFR BLD AUTO: 19.8 %
MAGNESIUM SERPL-MCNC: 2.2 MG/DL — SIGNIFICANT CHANGE UP (ref 1.6–2.6)
MAGNESIUM SERPL-MCNC: 2.3 MG/DL — SIGNIFICANT CHANGE UP (ref 1.6–2.6)
MAN DIFF?: NORMAL
MCHC RBC-ENTMCNC: 30.5 PG
MCHC RBC-ENTMCNC: 30.7 PG
MCHC RBC-ENTMCNC: 30.7 PG
MCHC RBC-ENTMCNC: 30.7 PG — SIGNIFICANT CHANGE UP (ref 27–34)
MCHC RBC-ENTMCNC: 31.1 PG
MCHC RBC-ENTMCNC: 31.1 PG — SIGNIFICANT CHANGE UP (ref 27–34)
MCHC RBC-ENTMCNC: 31.4 PG — SIGNIFICANT CHANGE UP (ref 27–34)
MCHC RBC-ENTMCNC: 31.4 PG — SIGNIFICANT CHANGE UP (ref 27–34)
MCHC RBC-ENTMCNC: 31.6 GM/DL
MCHC RBC-ENTMCNC: 31.7 GM/DL
MCHC RBC-ENTMCNC: 31.7 GM/DL
MCHC RBC-ENTMCNC: 31.9 PG — SIGNIFICANT CHANGE UP (ref 27–34)
MCHC RBC-ENTMCNC: 31.9 PG — SIGNIFICANT CHANGE UP (ref 27–34)
MCHC RBC-ENTMCNC: 32 GM/DL
MCHC RBC-ENTMCNC: 32 PG — SIGNIFICANT CHANGE UP (ref 27–34)
MCHC RBC-ENTMCNC: 32.3 GM/DL — SIGNIFICANT CHANGE UP (ref 32–36)
MCHC RBC-ENTMCNC: 32.4 PG — SIGNIFICANT CHANGE UP (ref 27–34)
MCHC RBC-ENTMCNC: 32.5 PG — SIGNIFICANT CHANGE UP (ref 27–34)
MCHC RBC-ENTMCNC: 32.8 GM/DL — SIGNIFICANT CHANGE UP (ref 32–36)
MCHC RBC-ENTMCNC: 32.9 G/DL — SIGNIFICANT CHANGE UP (ref 32–36)
MCHC RBC-ENTMCNC: 32.9 PG — SIGNIFICANT CHANGE UP (ref 27–34)
MCHC RBC-ENTMCNC: 33 PG — SIGNIFICANT CHANGE UP (ref 27–34)
MCHC RBC-ENTMCNC: 33.1 G/DL — SIGNIFICANT CHANGE UP (ref 32–36)
MCHC RBC-ENTMCNC: 33.2 GM/DL — SIGNIFICANT CHANGE UP (ref 32–36)
MCHC RBC-ENTMCNC: 33.5 G/DL — SIGNIFICANT CHANGE UP (ref 32–36)
MCHC RBC-ENTMCNC: 33.5 GM/DL — SIGNIFICANT CHANGE UP (ref 32–36)
MCHC RBC-ENTMCNC: 33.5 PG — SIGNIFICANT CHANGE UP (ref 27–34)
MCHC RBC-ENTMCNC: 33.8 G/DL — SIGNIFICANT CHANGE UP (ref 32–36)
MCHC RBC-ENTMCNC: 33.9 G/DL — SIGNIFICANT CHANGE UP (ref 32–36)
MCHC RBC-ENTMCNC: 34.2 G/DL — SIGNIFICANT CHANGE UP (ref 32–36)
MCHC RBC-ENTMCNC: 34.6 G/DL — SIGNIFICANT CHANGE UP (ref 32–36)
MCHC RBC-ENTMCNC: 34.9 G/DL — SIGNIFICANT CHANGE UP (ref 32–36)
MCV RBC AUTO: 94.5 FL — SIGNIFICANT CHANGE UP (ref 80–100)
MCV RBC AUTO: 94.5 FL — SIGNIFICANT CHANGE UP (ref 80–100)
MCV RBC AUTO: 95 FL — SIGNIFICANT CHANGE UP (ref 80–100)
MCV RBC AUTO: 95.3 FL — SIGNIFICANT CHANGE UP (ref 80–100)
MCV RBC AUTO: 95.5 FL — SIGNIFICANT CHANGE UP (ref 80–100)
MCV RBC AUTO: 95.5 FL — SIGNIFICANT CHANGE UP (ref 80–100)
MCV RBC AUTO: 95.7 FL — SIGNIFICANT CHANGE UP (ref 80–100)
MCV RBC AUTO: 95.8 FL — SIGNIFICANT CHANGE UP (ref 80–100)
MCV RBC AUTO: 95.8 FL — SIGNIFICANT CHANGE UP (ref 80–100)
MCV RBC AUTO: 96 FL — SIGNIFICANT CHANGE UP (ref 80–100)
MCV RBC AUTO: 96.1 FL
MCV RBC AUTO: 96.1 FL — SIGNIFICANT CHANGE UP (ref 80–100)
MCV RBC AUTO: 96.4 FL — SIGNIFICANT CHANGE UP (ref 80–100)
MCV RBC AUTO: 96.8 FL
MCV RBC AUTO: 96.9 FL
MCV RBC AUTO: 97.1 FL
MONOCYTES # BLD AUTO: 0.4 K/UL — SIGNIFICANT CHANGE UP (ref 0–0.9)
MONOCYTES # BLD AUTO: 0.8 K/UL — SIGNIFICANT CHANGE UP (ref 0–0.9)
MONOCYTES # BLD AUTO: 0.84 K/UL
MONOCYTES # BLD AUTO: 0.86 K/UL
MONOCYTES # BLD AUTO: 0.9 K/UL — SIGNIFICANT CHANGE UP (ref 0–0.9)
MONOCYTES # BLD AUTO: 0.96 K/UL
MONOCYTES # BLD AUTO: 1 K/UL — HIGH (ref 0–0.9)
MONOCYTES # BLD AUTO: 1.1 K/UL — HIGH (ref 0–0.9)
MONOCYTES # BLD AUTO: 1.21 K/UL
MONOCYTES NFR BLD AUTO: 10.8 % — SIGNIFICANT CHANGE UP (ref 2–14)
MONOCYTES NFR BLD AUTO: 11.5 % — SIGNIFICANT CHANGE UP (ref 2–14)
MONOCYTES NFR BLD AUTO: 12 % — SIGNIFICANT CHANGE UP (ref 2–14)
MONOCYTES NFR BLD AUTO: 12.3 %
MONOCYTES NFR BLD AUTO: 12.7 % — SIGNIFICANT CHANGE UP (ref 2–14)
MONOCYTES NFR BLD AUTO: 12.8 % — SIGNIFICANT CHANGE UP (ref 2–14)
MONOCYTES NFR BLD AUTO: 12.9 %
MONOCYTES NFR BLD AUTO: 13.8 % — SIGNIFICANT CHANGE UP (ref 2–14)
MONOCYTES NFR BLD AUTO: 14 % — SIGNIFICANT CHANGE UP (ref 2–14)
MONOCYTES NFR BLD AUTO: 14.2 % — HIGH (ref 2–14)
MONOCYTES NFR BLD AUTO: 14.4 %
MONOCYTES NFR BLD AUTO: 15 % — HIGH (ref 2–14)
MONOCYTES NFR BLD AUTO: 16.1 % — HIGH (ref 2–14)
MONOCYTES NFR BLD AUTO: 16.6 %
MONOCYTES NFR BLD AUTO: 4.7 % — SIGNIFICANT CHANGE UP (ref 2–14)
NEUTROPHILS # BLD AUTO: 4 K/UL — SIGNIFICANT CHANGE UP (ref 1.8–7.4)
NEUTROPHILS # BLD AUTO: 4.24 K/UL
NEUTROPHILS # BLD AUTO: 4.26 K/UL
NEUTROPHILS # BLD AUTO: 4.4 K/UL — SIGNIFICANT CHANGE UP (ref 1.8–7.4)
NEUTROPHILS # BLD AUTO: 4.6 K/UL — SIGNIFICANT CHANGE UP (ref 1.8–7.4)
NEUTROPHILS # BLD AUTO: 4.7 K/UL — SIGNIFICANT CHANGE UP (ref 1.8–7.4)
NEUTROPHILS # BLD AUTO: 4.82 K/UL
NEUTROPHILS # BLD AUTO: 4.9 K/UL
NEUTROPHILS # BLD AUTO: 5.1 K/UL — SIGNIFICANT CHANGE UP (ref 1.8–7.4)
NEUTROPHILS # BLD AUTO: 5.1 K/UL — SIGNIFICANT CHANGE UP (ref 1.8–7.4)
NEUTROPHILS # BLD AUTO: 5.9 K/UL — SIGNIFICANT CHANGE UP (ref 1.8–7.4)
NEUTROPHILS # BLD AUTO: 6.1 K/UL — SIGNIFICANT CHANGE UP (ref 1.8–7.4)
NEUTROPHILS # BLD AUTO: 6.2 K/UL — SIGNIFICANT CHANGE UP (ref 1.8–7.4)
NEUTROPHILS # BLD AUTO: 6.4 K/UL — SIGNIFICANT CHANGE UP (ref 1.8–7.4)
NEUTROPHILS # BLD AUTO: 7 K/UL — SIGNIFICANT CHANGE UP (ref 1.8–7.4)
NEUTROPHILS NFR BLD AUTO: 63.5 % — SIGNIFICANT CHANGE UP (ref 43–77)
NEUTROPHILS NFR BLD AUTO: 63.6 %
NEUTROPHILS NFR BLD AUTO: 64.6 % — SIGNIFICANT CHANGE UP (ref 43–77)
NEUTROPHILS NFR BLD AUTO: 65.3 %
NEUTROPHILS NFR BLD AUTO: 67.2 % — SIGNIFICANT CHANGE UP (ref 43–77)
NEUTROPHILS NFR BLD AUTO: 67.3 %
NEUTROPHILS NFR BLD AUTO: 68.8 % — SIGNIFICANT CHANGE UP (ref 43–77)
NEUTROPHILS NFR BLD AUTO: 69.2 %
NEUTROPHILS NFR BLD AUTO: 69.3 % — SIGNIFICANT CHANGE UP (ref 43–77)
NEUTROPHILS NFR BLD AUTO: 70.3 % — SIGNIFICANT CHANGE UP (ref 43–77)
NEUTROPHILS NFR BLD AUTO: 72.4 % — SIGNIFICANT CHANGE UP (ref 43–77)
NEUTROPHILS NFR BLD AUTO: 73.7 % — SIGNIFICANT CHANGE UP (ref 43–77)
NEUTROPHILS NFR BLD AUTO: 74 % — SIGNIFICANT CHANGE UP (ref 43–77)
NEUTROPHILS NFR BLD AUTO: 76.5 % — SIGNIFICANT CHANGE UP (ref 43–77)
NEUTROPHILS NFR BLD AUTO: 85.1 % — HIGH (ref 43–77)
NITRITE UR-MCNC: NEGATIVE — SIGNIFICANT CHANGE UP
NITRITE UR-MCNC: NEGATIVE — SIGNIFICANT CHANGE UP
PH UR: 7.5 — SIGNIFICANT CHANGE UP (ref 5–8)
PH UR: 8 — SIGNIFICANT CHANGE UP (ref 5–8)
PHOSPHATE SERPL-MCNC: 3.1 MG/DL — SIGNIFICANT CHANGE UP (ref 2.5–4.5)
PHOSPHATE SERPL-MCNC: 3.3 MG/DL — SIGNIFICANT CHANGE UP (ref 2.5–4.5)
PLATELET # BLD AUTO: 152 K/UL — SIGNIFICANT CHANGE UP (ref 150–400)
PLATELET # BLD AUTO: 189 K/UL — SIGNIFICANT CHANGE UP (ref 150–400)
PLATELET # BLD AUTO: 189 K/UL — SIGNIFICANT CHANGE UP (ref 150–400)
PLATELET # BLD AUTO: 194 K/UL
PLATELET # BLD AUTO: 206 K/UL — SIGNIFICANT CHANGE UP (ref 150–400)
PLATELET # BLD AUTO: 207 K/UL
PLATELET # BLD AUTO: 208 K/UL — SIGNIFICANT CHANGE UP (ref 150–400)
PLATELET # BLD AUTO: 211 K/UL — SIGNIFICANT CHANGE UP (ref 150–400)
PLATELET # BLD AUTO: 218 K/UL — SIGNIFICANT CHANGE UP (ref 150–400)
PLATELET # BLD AUTO: 220 K/UL — SIGNIFICANT CHANGE UP (ref 150–400)
PLATELET # BLD AUTO: 230 K/UL — SIGNIFICANT CHANGE UP (ref 150–400)
PLATELET # BLD AUTO: 234 K/UL — SIGNIFICANT CHANGE UP (ref 150–400)
PLATELET # BLD AUTO: 239 K/UL
PLATELET # BLD AUTO: 245 K/UL — SIGNIFICANT CHANGE UP (ref 150–400)
PLATELET # BLD AUTO: 288 K/UL
PLATELET # BLD AUTO: 337 K/UL — SIGNIFICANT CHANGE UP (ref 150–400)
POTASSIUM SERPL-MCNC: 4.2 MMOL/L — SIGNIFICANT CHANGE UP (ref 3.5–5.3)
POTASSIUM SERPL-MCNC: 4.4 MMOL/L — SIGNIFICANT CHANGE UP (ref 3.5–5.3)
POTASSIUM SERPL-MCNC: 4.4 MMOL/L — SIGNIFICANT CHANGE UP (ref 3.5–5.3)
POTASSIUM SERPL-MCNC: 4.8 MMOL/L — SIGNIFICANT CHANGE UP (ref 3.5–5.3)
POTASSIUM SERPL-MCNC: 5.3 MMOL/L — SIGNIFICANT CHANGE UP (ref 3.5–5.3)
POTASSIUM SERPL-SCNC: 4.2 MMOL/L — SIGNIFICANT CHANGE UP (ref 3.5–5.3)
POTASSIUM SERPL-SCNC: 4.3 MMOL/L
POTASSIUM SERPL-SCNC: 4.3 MMOL/L
POTASSIUM SERPL-SCNC: 4.4 MMOL/L — SIGNIFICANT CHANGE UP (ref 3.5–5.3)
POTASSIUM SERPL-SCNC: 4.4 MMOL/L — SIGNIFICANT CHANGE UP (ref 3.5–5.3)
POTASSIUM SERPL-SCNC: 4.7 MMOL/L
POTASSIUM SERPL-SCNC: 4.8 MMOL/L — SIGNIFICANT CHANGE UP (ref 3.5–5.3)
POTASSIUM SERPL-SCNC: 5.3 MMOL/L — SIGNIFICANT CHANGE UP (ref 3.5–5.3)
PROT SERPL-MCNC: 5.9 G/DL
PROT SERPL-MCNC: 6.1 G/DL — SIGNIFICANT CHANGE UP (ref 6–8.3)
PROT SERPL-MCNC: 6.2 G/DL
PROT SERPL-MCNC: 6.2 G/DL
PROT SERPL-MCNC: 6.2 G/DL — SIGNIFICANT CHANGE UP (ref 6–8.3)
PROT SERPL-MCNC: 6.2 G/DL — SIGNIFICANT CHANGE UP (ref 6–8.3)
PROT SERPL-MCNC: 6.5 G/DL — SIGNIFICANT CHANGE UP (ref 6–8.3)
PROT UR-MCNC: NEGATIVE — SIGNIFICANT CHANGE UP
PROT UR-MCNC: SIGNIFICANT CHANGE UP
PSA FLD-MCNC: <0.01 NG/ML — SIGNIFICANT CHANGE UP (ref 0–4)
RBC # BLD: 3.88 M/UL
RBC # BLD: 4.02 M/UL — LOW (ref 4.2–5.8)
RBC # BLD: 4.02 M/UL — LOW (ref 4.2–5.8)
RBC # BLD: 4.05 M/UL — LOW (ref 4.2–5.8)
RBC # BLD: 4.06 M/UL — LOW (ref 4.2–5.8)
RBC # BLD: 4.09 M/UL
RBC # BLD: 4.11 M/UL
RBC # BLD: 4.11 M/UL — LOW (ref 4.2–5.8)
RBC # BLD: 4.13 M/UL
RBC # BLD: 4.14 M/UL — LOW (ref 4.2–5.8)
RBC # BLD: 4.22 M/UL — SIGNIFICANT CHANGE UP (ref 4.2–5.8)
RBC # BLD: 4.22 M/UL — SIGNIFICANT CHANGE UP (ref 4.2–5.8)
RBC # BLD: 4.27 M/UL — SIGNIFICANT CHANGE UP (ref 4.2–5.8)
RBC # BLD: 4.33 M/UL — SIGNIFICANT CHANGE UP (ref 4.2–5.8)
RBC # FLD: 11.8 % — SIGNIFICANT CHANGE UP (ref 10.3–14.5)
RBC # FLD: 11.9 % — SIGNIFICANT CHANGE UP (ref 10.3–14.5)
RBC # FLD: 12 % — SIGNIFICANT CHANGE UP (ref 10.3–14.5)
RBC # FLD: 12.3 % — SIGNIFICANT CHANGE UP (ref 10.3–14.5)
RBC # FLD: 12.4 % — SIGNIFICANT CHANGE UP (ref 10.3–14.5)
RBC # FLD: 12.5 % — SIGNIFICANT CHANGE UP (ref 10.3–14.5)
RBC # FLD: 12.6 % — SIGNIFICANT CHANGE UP (ref 10.3–14.5)
RBC # FLD: 12.8 % — SIGNIFICANT CHANGE UP (ref 10.3–14.5)
RBC # FLD: 13 % — SIGNIFICANT CHANGE UP (ref 10.3–14.5)
RBC # FLD: 13 % — SIGNIFICANT CHANGE UP (ref 10.3–14.5)
RBC # FLD: 13.1 % — SIGNIFICANT CHANGE UP (ref 10.3–14.5)
RBC # FLD: 13.2 %
RBC # FLD: 13.3 %
RBC # FLD: 13.6 % — SIGNIFICANT CHANGE UP (ref 10.3–14.5)
RBC # FLD: 13.7 %
RBC # FLD: 14.3 %
RBC CASTS # UR COMP ASSIST: 1 /HPF — SIGNIFICANT CHANGE UP (ref 0–4)
SODIUM SERPL-SCNC: 137 MMOL/L — SIGNIFICANT CHANGE UP (ref 135–145)
SODIUM SERPL-SCNC: 139 MMOL/L — SIGNIFICANT CHANGE UP (ref 135–145)
SODIUM SERPL-SCNC: 139 MMOL/L — SIGNIFICANT CHANGE UP (ref 135–145)
SODIUM SERPL-SCNC: 140 MMOL/L
SODIUM SERPL-SCNC: 140 MMOL/L
SODIUM SERPL-SCNC: 140 MMOL/L — SIGNIFICANT CHANGE UP (ref 135–145)
SODIUM SERPL-SCNC: 141 MMOL/L
SODIUM SERPL-SCNC: 142 MMOL/L — SIGNIFICANT CHANGE UP (ref 135–145)
SP GR SPEC: 1.01 — SIGNIFICANT CHANGE UP (ref 1.01–1.02)
SP GR SPEC: 1.02 — SIGNIFICANT CHANGE UP (ref 1.01–1.02)
SPECIMEN SOURCE: SIGNIFICANT CHANGE UP
SPECIMEN SOURCE: SIGNIFICANT CHANGE UP
TRIGL SERPL-MCNC: 160 MG/DL
TROPONIN T, HIGH SENSITIVITY RESULT: 31 NG/L — SIGNIFICANT CHANGE UP (ref 0–51)
TSH SERPL-ACNC: 4.37 UIU/ML
UROBILINOGEN FLD QL: NEGATIVE — SIGNIFICANT CHANGE UP
UROBILINOGEN FLD QL: NEGATIVE — SIGNIFICANT CHANGE UP
VIT B12 SERPL-MCNC: 756 PG/ML
WBC # BLD: 11.7 K/UL — HIGH (ref 3.8–10.5)
WBC # BLD: 6.3 K/UL — SIGNIFICANT CHANGE UP (ref 3.8–10.5)
WBC # BLD: 6.7 K/UL — SIGNIFICANT CHANGE UP (ref 3.8–10.5)
WBC # BLD: 6.8 K/UL — SIGNIFICANT CHANGE UP (ref 3.8–10.5)
WBC # BLD: 6.8 K/UL — SIGNIFICANT CHANGE UP (ref 3.8–10.5)
WBC # BLD: 7.3 K/UL — SIGNIFICANT CHANGE UP (ref 3.8–10.5)
WBC # BLD: 7.6 K/UL — SIGNIFICANT CHANGE UP (ref 3.8–10.5)
WBC # BLD: 8 K/UL — SIGNIFICANT CHANGE UP (ref 3.8–10.5)
WBC # BLD: 8.1 K/UL — SIGNIFICANT CHANGE UP (ref 3.8–10.5)
WBC # BLD: 8.2 K/UL — SIGNIFICANT CHANGE UP (ref 3.8–10.5)
WBC # BLD: 8.5 K/UL — SIGNIFICANT CHANGE UP (ref 3.8–10.5)
WBC # BLD: 8.6 K/UL — SIGNIFICANT CHANGE UP (ref 3.8–10.5)
WBC # FLD AUTO: 11.7 K/UL — HIGH (ref 3.8–10.5)
WBC # FLD AUTO: 6.3 K/UL — SIGNIFICANT CHANGE UP (ref 3.8–10.5)
WBC # FLD AUTO: 6.52 K/UL
WBC # FLD AUTO: 6.67 K/UL
WBC # FLD AUTO: 6.7 K/UL — SIGNIFICANT CHANGE UP (ref 3.8–10.5)
WBC # FLD AUTO: 6.8 K/UL — SIGNIFICANT CHANGE UP (ref 3.8–10.5)
WBC # FLD AUTO: 6.8 K/UL — SIGNIFICANT CHANGE UP (ref 3.8–10.5)
WBC # FLD AUTO: 6.97 K/UL
WBC # FLD AUTO: 7.29 K/UL
WBC # FLD AUTO: 7.3 K/UL — SIGNIFICANT CHANGE UP (ref 3.8–10.5)
WBC # FLD AUTO: 7.6 K/UL — SIGNIFICANT CHANGE UP (ref 3.8–10.5)
WBC # FLD AUTO: 8 K/UL — SIGNIFICANT CHANGE UP (ref 3.8–10.5)
WBC # FLD AUTO: 8.1 K/UL — SIGNIFICANT CHANGE UP (ref 3.8–10.5)
WBC # FLD AUTO: 8.2 K/UL — SIGNIFICANT CHANGE UP (ref 3.8–10.5)
WBC # FLD AUTO: 8.5 K/UL — SIGNIFICANT CHANGE UP (ref 3.8–10.5)
WBC # FLD AUTO: 8.6 K/UL — SIGNIFICANT CHANGE UP (ref 3.8–10.5)
WBC UR QL: 0 /HPF — SIGNIFICANT CHANGE UP (ref 0–5)

## 2019-01-01 PROCEDURE — 99203 OFFICE O/P NEW LOW 30 MIN: CPT

## 2019-01-01 PROCEDURE — 97161 PT EVAL LOW COMPLEX 20 MIN: CPT

## 2019-01-01 PROCEDURE — 83735 ASSAY OF MAGNESIUM: CPT

## 2019-01-01 PROCEDURE — G0008: CPT

## 2019-01-01 PROCEDURE — 70553 MRI BRAIN STEM W/O & W/DYE: CPT | Mod: 26

## 2019-01-01 PROCEDURE — 99495 TRANSJ CARE MGMT MOD F2F 14D: CPT

## 2019-01-01 PROCEDURE — 99284 EMERGENCY DEPT VISIT MOD MDM: CPT | Mod: 25

## 2019-01-01 PROCEDURE — 81003 URINALYSIS AUTO W/O SCOPE: CPT

## 2019-01-01 PROCEDURE — 99291 CRITICAL CARE FIRST HOUR: CPT | Mod: GC

## 2019-01-01 PROCEDURE — 99284 EMERGENCY DEPT VISIT MOD MDM: CPT | Mod: GC

## 2019-01-01 PROCEDURE — 99239 HOSP IP/OBS DSCHRG MGMT >30: CPT

## 2019-01-01 PROCEDURE — 99215 OFFICE O/P EST HI 40 MIN: CPT | Mod: 25

## 2019-01-01 PROCEDURE — 71260 CT THORAX DX C+: CPT | Mod: 26

## 2019-01-01 PROCEDURE — 70450 CT HEAD/BRAIN W/O DYE: CPT | Mod: 26

## 2019-01-01 PROCEDURE — A9585: CPT

## 2019-01-01 PROCEDURE — 84100 ASSAY OF PHOSPHORUS: CPT

## 2019-01-01 PROCEDURE — G0444 DEPRESSION SCREEN ANNUAL: CPT | Mod: 59,PD

## 2019-01-01 PROCEDURE — 85027 COMPLETE CBC AUTOMATED: CPT

## 2019-01-01 PROCEDURE — 71045 X-RAY EXAM CHEST 1 VIEW: CPT

## 2019-01-01 PROCEDURE — 71260 CT THORAX DX C+: CPT

## 2019-01-01 PROCEDURE — 74177 CT ABD & PELVIS W/CONTRAST: CPT | Mod: 26

## 2019-01-01 PROCEDURE — 81001 URINALYSIS AUTO W/SCOPE: CPT

## 2019-01-01 PROCEDURE — 99214 OFFICE O/P EST MOD 30 MIN: CPT | Mod: 25

## 2019-01-01 PROCEDURE — 90662 IIV NO PRSV INCREASED AG IM: CPT

## 2019-01-01 PROCEDURE — 87086 URINE CULTURE/COLONY COUNT: CPT

## 2019-01-01 PROCEDURE — G0103: CPT

## 2019-01-01 PROCEDURE — 70553 MRI BRAIN STEM W/O & W/DYE: CPT

## 2019-01-01 PROCEDURE — 99223 1ST HOSP IP/OBS HIGH 75: CPT | Mod: GC

## 2019-01-01 PROCEDURE — 70450 CT HEAD/BRAIN W/O DYE: CPT

## 2019-01-01 PROCEDURE — 93010 ELECTROCARDIOGRAM REPORT: CPT

## 2019-01-01 PROCEDURE — 99232 SBSQ HOSP IP/OBS MODERATE 35: CPT

## 2019-01-01 PROCEDURE — 80053 COMPREHEN METABOLIC PANEL: CPT

## 2019-01-01 PROCEDURE — 99215 OFFICE O/P EST HI 40 MIN: CPT | Mod: 25,PD

## 2019-01-01 PROCEDURE — 93005 ELECTROCARDIOGRAM TRACING: CPT

## 2019-01-01 PROCEDURE — 72170 X-RAY EXAM OF PELVIS: CPT | Mod: 26

## 2019-01-01 PROCEDURE — 99285 EMERGENCY DEPT VISIT HI MDM: CPT

## 2019-01-01 PROCEDURE — 99354: CPT

## 2019-01-01 PROCEDURE — 71045 X-RAY EXAM CHEST 1 VIEW: CPT | Mod: 26

## 2019-01-01 PROCEDURE — 74177 CT ABD & PELVIS W/CONTRAST: CPT

## 2019-01-01 PROCEDURE — 99497 ADVNCD CARE PLAN 30 MIN: CPT | Mod: 25

## 2019-01-01 PROCEDURE — 99215 OFFICE O/P EST HI 40 MIN: CPT

## 2019-01-01 PROCEDURE — 84484 ASSAY OF TROPONIN QUANT: CPT

## 2019-01-01 PROCEDURE — 82962 GLUCOSE BLOOD TEST: CPT

## 2019-01-01 PROCEDURE — 72170 X-RAY EXAM OF PELVIS: CPT

## 2019-01-01 PROCEDURE — 99213 OFFICE O/P EST LOW 20 MIN: CPT

## 2019-01-01 RX ORDER — DEXAMETHASONE 0.5 MG/5ML
1 ELIXIR ORAL
Qty: 15 | Refills: 0
Start: 2019-01-01 | End: 2019-01-01

## 2019-01-01 RX ORDER — DEXAMETHASONE 0.5 MG/5ML
2 ELIXIR ORAL EVERY 12 HOURS
Refills: 0 | Status: DISCONTINUED | OUTPATIENT
Start: 2019-01-01 | End: 2019-01-01

## 2019-01-01 RX ORDER — LEVETIRACETAM 250 MG/1
1000 TABLET, FILM COATED ORAL ONCE
Refills: 0 | Status: COMPLETED | OUTPATIENT
Start: 2019-01-01 | End: 2019-01-01

## 2019-01-01 RX ORDER — DEXAMETHASONE 0.5 MG/5ML
10 ELIXIR ORAL ONCE
Refills: 0 | Status: DISCONTINUED | OUTPATIENT
Start: 2019-01-01 | End: 2019-01-01

## 2019-01-01 RX ORDER — SERTRALINE 25 MG/1
1 TABLET, FILM COATED ORAL
Qty: 0 | Refills: 0 | DISCHARGE

## 2019-01-01 RX ORDER — ENOXAPARIN SODIUM 100 MG/ML
40 INJECTION SUBCUTANEOUS DAILY
Refills: 0 | Status: DISCONTINUED | OUTPATIENT
Start: 2019-01-01 | End: 2019-01-01

## 2019-01-01 RX ORDER — ATORVASTATIN CALCIUM 80 MG/1
10 TABLET, FILM COATED ORAL AT BEDTIME
Refills: 0 | Status: DISCONTINUED | OUTPATIENT
Start: 2019-01-01 | End: 2019-01-01

## 2019-01-01 RX ORDER — POLYETHYLENE GLYCOL 3350 17 G/17G
17 POWDER, FOR SOLUTION ORAL
Qty: 1 | Refills: 3 | Status: ACTIVE | COMMUNITY
Start: 2019-01-01

## 2019-01-01 RX ORDER — TEMOZOLOMIDE 100 MG/1
100 CAPSULE ORAL
Qty: 5 | Refills: 0 | Status: DISCONTINUED | COMMUNITY
Start: 2019-01-01 | End: 2019-01-01

## 2019-01-01 RX ORDER — LEVETIRACETAM 250 MG/1
1 TABLET, FILM COATED ORAL
Qty: 60 | Refills: 0
Start: 2019-01-01 | End: 2019-01-01

## 2019-01-01 RX ORDER — DOCUSATE SODIUM 100 MG/1
100 CAPSULE ORAL TWICE DAILY
Qty: 180 | Refills: 1 | Status: ACTIVE | COMMUNITY
Start: 2019-01-01

## 2019-01-01 RX ORDER — PANTOPRAZOLE SODIUM 20 MG/1
1 TABLET, DELAYED RELEASE ORAL
Qty: 10 | Refills: 0
Start: 2019-01-01 | End: 2019-01-01

## 2019-01-01 RX ORDER — ENOXAPARIN SODIUM 100 MG/ML
30 INJECTION SUBCUTANEOUS DAILY
Refills: 0 | Status: DISCONTINUED | OUTPATIENT
Start: 2019-01-01 | End: 2019-01-01

## 2019-01-01 RX ORDER — TEMOZOLOMIDE 20 MG/1
20 CAPSULE ORAL
Qty: 21 | Refills: 1 | Status: DISCONTINUED | COMMUNITY
Start: 2019-01-01 | End: 2019-01-01

## 2019-01-01 RX ORDER — DEXAMETHASONE 0.5 MG/5ML
4 ELIXIR ORAL ONCE
Refills: 0 | Status: COMPLETED | OUTPATIENT
Start: 2019-01-01 | End: 2019-01-01

## 2019-01-01 RX ORDER — SERTRALINE 25 MG/1
25 TABLET, FILM COATED ORAL DAILY
Qty: 90 | Refills: 0 | Status: ACTIVE | COMMUNITY
Start: 1900-01-01 | End: 1900-01-01

## 2019-01-01 RX ORDER — PANTOPRAZOLE SODIUM 20 MG/1
40 TABLET, DELAYED RELEASE ORAL
Refills: 0 | Status: DISCONTINUED | OUTPATIENT
Start: 2019-01-01 | End: 2019-01-01

## 2019-01-01 RX ORDER — LEVETIRACETAM 500 MG/1
500 TABLET, FILM COATED ORAL
Qty: 60 | Refills: 0 | Status: DISCONTINUED | COMMUNITY
Start: 2019-01-01 | End: 2019-01-01

## 2019-01-01 RX ORDER — TEMOZOLOMIDE 140 MG/1
140 CAPSULE ORAL
Qty: 5 | Refills: 0 | Status: DISCONTINUED | COMMUNITY
Start: 2019-01-01 | End: 2019-01-01

## 2019-01-01 RX ORDER — SODIUM CHLORIDE 9 MG/ML
1000 INJECTION, SOLUTION INTRAVENOUS ONCE
Refills: 0 | Status: COMPLETED | OUTPATIENT
Start: 2019-01-01 | End: 2019-01-01

## 2019-01-01 RX ORDER — LEVETIRACETAM 250 MG/1
500 TABLET, FILM COATED ORAL
Refills: 0 | Status: DISCONTINUED | OUTPATIENT
Start: 2019-01-01 | End: 2019-01-01

## 2019-01-01 RX ADMIN — ATORVASTATIN CALCIUM 10 MILLIGRAM(S): 80 TABLET, FILM COATED ORAL at 21:27

## 2019-01-01 RX ADMIN — PANTOPRAZOLE SODIUM 40 MILLIGRAM(S): 20 TABLET, DELAYED RELEASE ORAL at 06:22

## 2019-01-01 RX ADMIN — LEVETIRACETAM 500 MILLIGRAM(S): 250 TABLET, FILM COATED ORAL at 18:02

## 2019-01-01 RX ADMIN — Medication 2 MILLIGRAM(S): at 18:02

## 2019-01-01 RX ADMIN — Medication 4 MILLIGRAM(S): at 17:41

## 2019-01-01 RX ADMIN — SODIUM CHLORIDE 1000 MILLILITER(S): 9 INJECTION, SOLUTION INTRAVENOUS at 10:26

## 2019-01-01 RX ADMIN — PANTOPRAZOLE SODIUM 40 MILLIGRAM(S): 20 TABLET, DELAYED RELEASE ORAL at 06:09

## 2019-01-01 RX ADMIN — ATORVASTATIN CALCIUM 10 MILLIGRAM(S): 80 TABLET, FILM COATED ORAL at 00:11

## 2019-01-01 RX ADMIN — LEVETIRACETAM 500 MILLIGRAM(S): 250 TABLET, FILM COATED ORAL at 06:09

## 2019-01-01 RX ADMIN — LEVETIRACETAM 400 MILLIGRAM(S): 250 TABLET, FILM COATED ORAL at 14:16

## 2019-01-01 RX ADMIN — Medication 2 MILLIGRAM(S): at 17:42

## 2019-01-01 RX ADMIN — Medication 2 MILLIGRAM(S): at 06:09

## 2019-01-01 RX ADMIN — LEVETIRACETAM 500 MILLIGRAM(S): 250 TABLET, FILM COATED ORAL at 17:41

## 2019-01-01 RX ADMIN — Medication 2 MILLIGRAM(S): at 06:23

## 2019-01-01 RX ADMIN — ENOXAPARIN SODIUM 30 MILLIGRAM(S): 100 INJECTION SUBCUTANEOUS at 11:44

## 2019-01-01 RX ADMIN — LEVETIRACETAM 500 MILLIGRAM(S): 250 TABLET, FILM COATED ORAL at 06:23

## 2019-05-28 PROBLEM — Z13.21 ENCOUNTER FOR VITAMIN DEFICIENCY SCREENING: Status: ACTIVE | Noted: 2019-01-01

## 2019-05-28 PROBLEM — Z12.83 SCREENING FOR SKIN CANCER: Status: ACTIVE | Noted: 2019-01-01

## 2019-08-28 PROBLEM — M54.16 LUMBAR BACK PAIN WITH RADICULOPATHY AFFECTING LEFT LOWER EXTREMITY: Status: RESOLVED | Noted: 2019-01-01 | Resolved: 2019-01-01

## 2019-08-28 PROBLEM — H54.7 VISION LOSS: Status: ACTIVE | Noted: 2019-01-01

## 2019-08-28 PROBLEM — Z23 NEED FOR SHINGLES VACCINE: Status: ACTIVE | Noted: 2018-12-26

## 2019-08-28 PROBLEM — R41.3 MEMORY LOSS: Status: ACTIVE | Noted: 2019-01-01

## 2019-08-28 PROBLEM — M54.42 CHRONIC LEFT-SIDED LOW BACK PAIN WITH LEFT-SIDED SCIATICA: Status: ACTIVE | Noted: 2019-01-01

## 2019-08-30 NOTE — H&P ADULT - ASSESSMENT
The patient is a 89 y/o man with hx prostate cancer s/p seeding (remote) presents with word finding difficulty for two weeks; CT head revealed irregular heterogeneous mass lesion in the inferolateral left temporal lobe with diffuse and edema and mass effect, with minimal 4 mm rightward shift. Primary neoplasm vs metastatic lesion.

## 2019-08-30 NOTE — CONSULT NOTE ADULT - SUBJECTIVE AND OBJECTIVE BOX
p ([id number])     HPI:  The patient is a 89 y/o man with hx prostate cancer s/p seeding (remote) presents with word finding difficulty for two weeks. Per patient and family, patient also has been having trouble reading. He is able to write. Denies chest pain, SOB, nausea, vomiting, diarrhea, chills, fever, headache, change in vision. No sick contacts. No changes in diet. No history of head trauma. Has had some gait instability per family. CT head revealed irregular heterogeneous mass lesion in the inferolateral left temporal lobe with diffuse and edema and mass effect, with minimal 4 mm rightward shift. Primary neoplasm vs metastatic lesion. MRI brain w/wout pending.     In the ED: given 1g IV Keppra, 1 L lactated ringers. Neurosurgery evaluated patient, defer intervention at this time.     The patient is admitted to medicine for further evaluation. (30 Aug 2019 15:06)      Imaging:    Exam:  AOx2, FC, no facial, mild receptive aphasia   5/5 throughout, no drift  SILT  no clonus    --Anticoagulation:  enoxaparin Injectable 40 milliGRAM(s) SubCutaneous daily    =====================  PAST MEDICAL HISTORY   Prostate cancer  Anxiety and depression    PAST SURGICAL HISTORY   S/P appendectomy  S/P tonsillectomy        MEDICATIONS:  Antibiotics:    Neuro:  levETIRAcetam 500 milliGRAM(s) Oral two times a day    Other:  atorvastatin 10 milliGRAM(s) Oral at bedtime  dexamethasone  IVPB 10 milliGRAM(s) IV Intermittent once  pantoprazole    Tablet 40 milliGRAM(s) Oral before breakfast      SOCIAL HISTORY:   Occupation:   Marital Status:     FAMILY HISTORY:      ROS: Negative except per HPI    LABS:                          12.8   8.1   )-----------( 218      ( 30 Aug 2019 10:28 )             38.4     08-30    140  |  102  |  19  ----------------------------<  92  4.4   |  27  |  1.05    Ca    9.5      30 Aug 2019 10:28    TPro  6.5  /  Alb  4.1  /  TBili  0.9  /  DBili  x   /  AST  19  /  ALT  16  /  AlkPhos  79  08-30

## 2019-08-30 NOTE — H&P ADULT - HISTORY OF PRESENT ILLNESS
The patient is a 89 y/o man with hx prostate cancer s/p seeding (remote) presents with word finding difficulty for two weeks. Per patient and family, patient also has been having trouble reading. He is able to write. Denies chest pain, SOB, nausea, vomiting, diarrhea, chills, fever, headache, change in vision. No sick contacts. No changes in diet. No history of head trauma. Has had some gait instability per family. CT head revealed irregular heterogeneous mass lesion in the inferolateral left temporal lobe with diffuse and edema and mass effect, with minimal 4 mm rightward shift. Primary neoplasm vs metastatic lesion. MRI brain w/wout pending.     In the ED: given 1g IV Keppra, 1 L lactated ringers. Neurosurgery evaluated patient, defer intervention at this time.     The patient is admitted to medicine for further evaluation.

## 2019-08-30 NOTE — H&P ADULT - NSHPREVIEWOFSYSTEMS_GEN_ALL_CORE
REVIEW OF SYSTEMS:    CONSTITUTIONAL: No weakness, fevers or chills  EYES/ENT: No visual changes;  no throat pain   NECK: No pain or stiffness  RESPIRATORY: No cough, wheezing, hemoptysis; No shortness of breath  CARDIOVASCULAR: No chest pain or palpitations  GASTROINTESTINAL: No abdominal or epigastric pain. No nausea, vomiting, or hematemesis; No diarrhea or constipation. No melena or hematochezia.  GENITOURINARY: No dysuria, change in frequency or hematuria  NEUROLOGICAL: No numbness or weakness, +word finding difficulty, +reading difficultly  SKIN: No itching, burning, rashes, or lesions   All other review of systems is negative unless indicated above.

## 2019-08-30 NOTE — ED PROVIDER NOTE - CLINICAL SUMMARY MEDICAL DECISION MAKING FREE TEXT BOX
89M hx depression on sertraline presents with a couple weeks of speech difficulty and word finding difficulty. likely dementia vs stroke vs uti, check ua, ct head, rehydrate, basic labs.

## 2019-08-30 NOTE — ED PROVIDER NOTE - PHYSICAL EXAMINATION
GEN: Well appearing, well nourished, in no apparent distress.  HEAD: NCAT  HEENT: PERRL, EOMI, no nystagmus, no facial droop, Airway patent, uvula midline, MMM, neck supple, no LAD, no JVD, no raccoon sign, no duran sign   LUNG: CTAB, no adventitious sounds, no retractions, no nasal flaring  CV: RRR, no murmurs,   Abd: soft, NTND, no rebound or guarding, BS+ in all quadrants, no CVAT  MSK: WWP, Pulses 2+ in extremities, No edema, no visible deformities, strength 5/5 in all extremities,   Neuro:  Difficulty with word finding but has comprehensive speech, CN II-XII in tact. AAOx3, Ambulatory with unstable gait. sensations in tact in all extremities, neg pronator drift, neg finger to nose,  neg romberg  Skin: Warm and dry, no evidence of rash, L elbow abrasion  Psych: normal mood and affect

## 2019-08-30 NOTE — ED ADULT NURSE NOTE - NSIMPLEMENTINTERV_GEN_ALL_ED
Implemented All Fall with Harm Risk Interventions:  [place] to call system. Call bell, personal items and telephone within reach. Instruct patient to call for assistance. Room bathroom lighting operational. Non-slip footwear when patient is off stretcher. Physically safe environment: no spills, clutter or unnecessary equipment. Stretcher in lowest position, wheels locked, appropriate side rails in place. Provide visual cue, wrist band, yellow gown, etc. Monitor gait and stability. Monitor for mental status changes and reorient to person, place, and time. Review medications for side effects contributing to fall risk. Reinforce activity limits and safety measures with patient and family. Provide visual clues: red socks.

## 2019-08-30 NOTE — ED PROVIDER NOTE - NS ED ROS FT
Constitutional: no fevers, no chills.  Eyes: no visual changes.  Ears: no ear drainage, no ear pain.  Nose: no nasal congestion.  Mouth/Throat: no sore throat.  Cardiovascular: no chest pain.  Respiratory: no shortness of breath, no wheezing, no cough  Gastrointestinal: no nausea, no vomiting, no diarrhea, no abdominal pain.  MSK: no flank pain, no back pain.  Genitourinary: no dysuria, no hematuria.  Skin: no rashes.  Neuro: no headache, +speech difficulty  Psychiatric: no known mental health issues.

## 2019-08-30 NOTE — ED PROVIDER NOTE - OBJECTIVE STATEMENT
89M hx depression on sertraline presents with a couple weeks of speech difficulty and word finding difficulty. L elbow abrasion that he and his wife both deny from falling, saying scratched it on something today. Denies any facial droop, slurred speech, urinary symptoms, difficulty speaking or word finding, difficulty swallowing, focal weakness/numbess/tingling, LOC, difficulty walking, imbalance or other neuro deficits. Denies DYSPHAGIA.

## 2019-08-30 NOTE — H&P ADULT - PROBLEM SELECTOR PLAN 1
-primary neoplasm vs metastasis  -patient hemodynamically stable, afebrile, UA negative, CXR clear  -pt with word finding aphasia for the last two weeks, progressively getting worse  -CTH irregular heterogeneous mass lesion in the inferolateral left temporal   lobe with diffuse and edema and mass effect, with minimal 4 mm rightward   shift  -Neurosurgery evaluated patient in ED, deferring acute intervention  -f/u MRI w/wout contrast  -f/u CT CAP with contrast   -loaded with 1g Keppra, c/w PO Keppra 500 BID  -neuro checks q4h  -fall precautions -primary neoplasm vs metastasis  -patient hemodynamically stable, afebrile, UA negative, CXR clear  -pt with word finding aphasia for the last two weeks, progressively getting worse  -CTH irregular heterogeneous mass lesion in the inferolateral left temporal   lobe with diffuse and edema and mass effect, with minimal 4 mm rightward   shift  -Neurosurgery evaluated patient in ED, deferring acute intervention  -f/u MRI w/wout contrast  -order CT CAP with contrast once MRI results reviewed, discuss with family/pt    -loaded with 1g Keppra, c/w PO Keppra 500 BID  -neuro checks q4h  -fall precautions, seizure precautions  -decadron 10 IV now, then start IV 4q6h -primary neoplasm vs metastasis  -patient hemodynamically stable, afebrile, UA negative, CXR clear  -pt with word finding aphasia for the last two weeks, progressively getting worse  -CTH irregular heterogeneous mass lesion in the inferolateral left temporal   lobe with diffuse and edema and mass effect, with minimal 4 mm rightward   shift  -Neurosurgery evaluated patient in ED, deferring acute intervention  -f/u MRI w/wout contrast  -order CT CAP with contrast once MRI results reviewed, discuss with family/pt    -loaded with 1g Keppra, c/w PO Keppra 500 BID  -neuro checks q4h  -fall precautions, seizure precautions  -decadron 4mg IV now, then start IV 2mg q 12h given his age and possible  psychosis

## 2019-08-30 NOTE — H&P ADULT - NSHPPHYSICALEXAM_GEN_ALL_CORE
GENERAL: NAD  HEENT: PERRL, MMM, no oropharyngeal lesions or erythema appreciated  Pulm: normal work of breathing, CTABL  CV: RRR, S1&S2+, no m/r/g appreciated  ABDOMEN: soft, nt, mild distention, no hepatosplenomegaly, BS+  MSK: nl ROM  EXTREMITIES:  no appreciable edema in b/l LE, venous stasus changes in b/l LE  Neuro: A&Ox3, CN II-XII intact, strength and sensation intact throughout, finger to nose intact, crosses the midline, visual fields intact, EOMI, aphasic (word finding difficulty), cannot read aloud correctly  SKIN: warm and dry, no visible rash

## 2019-08-30 NOTE — ED PROVIDER NOTE - ATTENDING CONTRIBUTION TO CARE
89 y/o m with pmhx presents by EMS for eval of AMS. patient came with concerns of difficulty findings words and confused. patient lives at home with family. no known recent trauma.   no vomiting no diarrhea. patient unable to offer details.   Gen.    HEENT:    Lungs:    CVS: S1S2   Abd;    Ext:   Neuro:  MSK: 89 y/o m with pmhx presents by EMS for eval of AMS. patient came with concerns of difficulty findings words and confused. patient lives at home with family. no known recent trauma.   no vomiting no diarrhea. patient unable to offer details.   Gen.  no acute distress  HEENT:  perrl eomi  Lungs:  b/l bs  CVS: S1S2   Abd;  soft non tender  Ext: moving all ext  Neuro: awake. alert, oriented to person and place but not times. some answers not correct or appropriate.  MSK: moves all extremities.

## 2019-08-30 NOTE — H&P ADULT - NSHPLABSRESULTS_GEN_ALL_CORE
12.8   8.1   )-----------( 218      ( 30 Aug 2019 10:28 )             38.4       08-    140  |  102  |  19  ----------------------------<  92  4.4   |  27  |  1.05    Ca    9.5      30 Aug 2019 10:28    TPro  6.5  /  Alb  4.1  /  TBili  0.9  /  DBili  x   /  AST  19  /  ALT  16  /  AlkPhos  79                Urinalysis Basic - ( 30 Aug 2019 13:18 )    Color: Light Yellow / Appearance: Clear / S.018 / pH: x  Gluc: x / Ketone: Negative  / Bili: Negative / Urobili: Negative   Blood: x / Protein: Trace / Nitrite: Negative   Leuk Esterase: Negative / RBC: 1 /hpf / WBC 0 /HPF   Sq Epi: x / Non Sq Epi: 0 /hpf / Bacteria: Negative        CAPILLARY BLOOD GLUCOSE      POCT Blood Glucose.: 100 mg/dL (30 Aug 2019 10:16)      Radiology:  CTH:  Irregular heterogeneous mass lesion in the inferolateral left temporal   lobe with diffuse and edema and mass effect, with minimal 4 mm rightward   shift, underlying areas of ischemia also not excluded, suggest MR with   gadolinium for improved assessment. Volume loss with microvascular   disease, no acute hemorrhage, basal stent are patent.    CXR: clear lungs      EKG:  pending

## 2019-08-30 NOTE — ED PROVIDER NOTE - CRITICAL CARE PROVIDED
consult w/ pt's family directly relating to pts condition/consultation with other physicians/additional history taking/direct patient care (not related to procedure)/documentation

## 2019-08-30 NOTE — ED ADULT NURSE NOTE - OBJECTIVE STATEMENT
pt 89 yo male via ems for difficulty word finding over the last week pt presents from home lives with wife pt alert oriented and verbal on arrival states he is having some difficulty word finding when speaking also had fall yesterday with skin tear to left outer elbow pt denies any cane or walker use motor sensory intact to extremities skin warm dry vitals stable pt pending md evaluation pt 89 yo male via ems for difficulty word finding over the last week pt presents from home lives with wife pt alert oriented and verbal on arrival states he is having some difficulty word finding when speaking also had fall yesterday with skin tear to left outer elbow pt denies any cane or walker use motor sensory intact to extremities skin warm dry vitals stable pt pending md evaluation per daughter pt saw primary md with concerns this week but per wife symptoms worsened today

## 2019-08-30 NOTE — ED ADULT NURSE NOTE - CHPI ED NUR SYMPTOMS NEG
no blurred vision/no change in level of consciousness/no nausea/no numbness/no loss of consciousness/no dizziness/no fever

## 2019-09-01 NOTE — DISCHARGE NOTE PROVIDER - CARE PROVIDER_API CALL
Dalia Chang (DO)  Neurological Surgery  [address] [place] [address], [address]  [place] [address]  Phone: [phone]  Fax: [phone]  Follow Up Time: Dalia Chang ()  Neurological Surgery  [address], [address]  [place] [address]  Phone: [phone]  Fax: [phone]  Follow Up Time:     Eve Bartlett)  Neurology  [address], [address]  [place] [address]  Phone: [phone]  Fax: [phone]  Follow Up Time:     Dede Inman)  Medicine  Geriatric Medicine  [address] [place] [address], [address]  [place] [address]  Phone: [phone]  Fax: [phone]  Follow Up Time:

## 2019-09-01 NOTE — PHYSICAL THERAPY INITIAL EVALUATION ADULT - PLANNED THERAPY INTERVENTIONS, PT EVAL
stair negotiation: GOAL: Pt will be able to Negotiate up/down 4 steps, Independently, w/ rails/and appropriate assistive device, w/reciprocal/step-to gait pattern, in 4 weeks./bed mobility training/balance training/gait training/transfer training

## 2019-09-01 NOTE — PHYSICAL THERAPY INITIAL EVALUATION ADULT - PRECAUTIONS/LIMITATIONS, REHAB EVAL
CTH: Irregular heterogeneous mass lesion in the inferolateral left temporal lobe with diffuse and edema and mass effect, with minimal 4 mm rightward shift, underlying areas of ischemia also not excluded, suggest MR with gadolinium for improved assessment. Volume loss with microvascular disease, no acute hemorrhage, basal stent are patent. MR Head: Heterogenous-appearing mass in the left lateral temporal lobe with surrounding vasogenic edema. There is minimal rightward midline shift with no hydrocephalus or intraparenchymal hemorrhage present./fall precautions

## 2019-09-01 NOTE — DISCHARGE NOTE PROVIDER - NSDCCPCAREPLAN_GEN_ALL_CORE_FT
PRINCIPAL DISCHARGE DIAGNOSIS  Diagnosis: Brain mass  Assessment and Plan of Treatment: You came to the hospital with 2 weeks of word finding difficulty. Brain scans showed a mass with surrounding swelling, but were unable to give a more detailed answer as to the cause of the mass. Scan of your chest, abdomen and pelvis showed no signs of other masses. You were given steroids to reduce the swelling around the mass. Neurosurgery evaluated you and recommend no surgery given the risks involved in brain surgery. Please continue your steroids and keppra as prescribed and followup with neurosurgery

## 2019-09-01 NOTE — CONSULT NOTE ADULT - SUBJECTIVE AND OBJECTIVE BOX
HPI:  89Y M with PMH prostate cancer s/p seeding presents with word finding difficulty for the past 2 weeks. Family endorses difficulty reading and gait instability over the past 2 weeks as well. CTH revealed  irregular heterogeneous mass lesion in the inferolateral left temporal lobe with diffuse and edema and mass effect, with minimal 4 mm rightward shift. Primary neoplasm vs metastatic lesion. MRI brain confirmed L lateral temporal lobe mass measuring 2.8 x3.7 x 3.0cm with surrounding vasogenic edema and areas of punctuate hemorrhage within the mass. No acute surgical intervention offered per Neurosurgery, however patient was put on keppra 500mg BID prophylactically and decadron 2mg IV BID. Pan-scan for primary lesion negative. Denies headache, nausea, vomiting, dizziness, weakness.  Neurology consulted for expressive aphasia.       MEDICATIONS  (STANDING):  atorvastatin 10 milliGRAM(s) Oral at bedtime  dexamethasone  Injectable 2 milliGRAM(s) IV Push every 12 hours  levETIRAcetam 500 milliGRAM(s) Oral two times a day  pantoprazole    Tablet 40 milliGRAM(s) Oral before breakfast    MEDICATIONS  (PRN):    PAST MEDICAL & SURGICAL HISTORY:  Prostate cancer  Anxiety and depression  S/P appendectomy  S/P tonsillectomy    FAMILY HISTORY:    Allergies    No Known Allergies    Intolerances        SHx - No smoking, No ETOH, No drug abuse    Review of Systems:  A 10 system ROS was performed and negative except for those mentioned in HPI    Vital Signs Last 24 Hrs  T(C): 36.3 (01 Sep 2019 04:56), Max: 36.4 (31 Aug 2019 14:20)  T(F): 97.3 (01 Sep 2019 04:56), Max: 97.6 (31 Aug 2019 21:39)  HR: 70 (01 Sep 2019 09:33) (65 - 72)  BP: 123/78 (01 Sep 2019 09:33) (113/68 - 127/72)  BP(mean): --  RR: 18 (01 Sep 2019 04:56) (18 - 18)  SpO2: 100% (01 Sep 2019 09:33) (95% - 100%)    General Exam:   General appearance: No acute distress                   Neurological Exam:  Mental Status: Orientated to self, date and place.    No dysarthria, aphasia or neglect.      Cranial Nerves: CN I - not tested.  PERRL, EOMI, VFF, no nystagmus or diplopia.  No APD.    Fundi not visualized bilaterally.    No facial asymmetry.  Hearing intact to finger rub bilaterally.     Motor:   Tone: normal.                  Strength:     [] Upper extremity                      Delt       Bicep    Tricep                                                  R         5/5        5/5        5/5       5/5                                               L          5/5        5/5        5/5       5/5  [] Lower extremity                       HF          KE          KF        DF         PF                                               R        5/5        5/5        5/5       5/5       5/5                                               L         5/5        5/5       5/5       5/5        5/5  Pronator drift: none                 Dysmetria: BL NL FTN  No truncal ataxia.    Tremor: No resting, postural or action tremor.  No myoclonus.    Sensation: intact to light touch, pinprick, vibration and proprioception    Deep Tendon Reflexes:   Right 2+ : BC, TC, BRD   Left 2+ : BC, TC, BRD  Right 2+ Knee, 1+ ankle  Left 2+ Knee, 1+ ankle    Toes flexor bilaterally  Gait: normal and stable.      Other:    09-01    142  |  106  |  25<H>  ----------------------------<  134<H>  4.4   |  26  |  1.01    Ca    9.3      01 Sep 2019 07:17  Phos  3.1     09-01  Mg     2.3     09-01    TPro  6.1  /  Alb  3.9  /  TBili  0.3  /  DBili  x   /  AST  17  /  ALT  22  /  AlkPhos  88  09-01 09-01    142  |  106  |  25<H>  ----------------------------<  134<H>  4.4   |  26  |  1.01    Ca    9.3      01 Sep 2019 07:17  Phos  3.1     09-01  Mg     2.3     09-01    TPro  6.1  /  Alb  3.9  /  TBili  0.3  /  DBili  x   /  AST  17  /  ALT  22  /  AlkPhos  88  09-01                          12.8   11.7  )-----------( 230      ( 31 Aug 2019 15:35 )             38.3       Radiology    < from: CT Head No Cont (08.30.19 @ 11:00) >  EXAM:  CT BRAIN                            PROCEDURE DATE:  08/30/2019            INTERPRETATION:  CLINICAL INDICATION: Mental status change, confusion,   delirium, altered loss of consciousness, nonunion-year-old male,   depression, several week speech difficulty and word finding difficulty,    Technique: Noncontrast CT of the head was performed.    Multiple contiguous axial images were acquired from the skull base to the   vertex without the administration of intravenous contrast. Coronal and  sagittal reformations were made.    COMPARISON: None.    FINDINGS:    There is an irregular heterogeneous mass lesion partially seen in the   inferolateral left temporal lobe, (2:14), with decreased attenuation,   edema, mass effect and the frontal, temporal, and parietal lobes, with   extension along the left posterior limb of the internal capsule, and the   left posterior external capsule, concerning for underlying neoplasm,   additional regions of ischemic change also not excluded, strongly suggest   improved assessment using MRI with gadolinium.    There is edema, mass effect, effacement of the left cerebral hemisphere   with mild 4 mm rightward shift and slight right uncal herniation (2:13).   There is volume loss, with enlarged ventricles and sulci with with   nonspecific white matter decreased aeration, likely microvascular disease   with multiple remote lacunar infarcts. There is atherosclerotic vascular   calcification of the carotid siphons there is mineralization/calcination   the globus pallidus. Basal cisterns appear patent.    There are absent orbital lenses with previous scattered surgery. There is   mucosal thickening, retention cysts/polyps in the right maxillary sinus.   Mastoids are free of acute disease. Calvarium is intact with patchy   lucency and sclerosis of the clivus. Dental hardware noted in situ    IMPRESSION:    Irregular heterogeneous mass lesion in the inferolateral left temporal   lobe with diffuse and edema and mass effect, with minimal 4 mm rightward   shift, underlying areas of ischemia also not excluded, suggest MR with   gadolinium for improved assessment. Volume loss with microvascular   disease, no acute hemorrhage, basal stent are patent.    Findings discussed with Dr. Heath in the emergency department at   immediate time of review on 8/30/2019 at 11:14 AM.    < from: MR Head w/wo IV Cont (08.31.19 @ 12:23) >  EXAM:  MR BRAIN WAW IC                            PROCEDURE DATE:  08/31/2019            INTERPRETATION:  CLINICAL INFORMATION: Dysphasia. Memory loss. Brain mass   on CT head 8/30/2019    TECHNIQUE: MRI of the brain was performed with and without contrast.   Sagittal and axial T1, axial T2, FLAIR, SWI, diffusion-weighted images   and an ADC map were obtained.    6 cc of Gadavist was injected, with 1.5 cc discarded.    COMPARISON: CT head 8/30/2019    FINDINGS:    As on CT head 8/30/2019, thereis a heterogenous appearing mass in the   left lateral temporal lobe measuring 2.8 x 3.7 x 3 cm (AP, CC, TRV).   There is surrounding vasogenic edema in the temporal and parietal lobes.   There are areas of of punctate hemorrhage versus mineralization within   the mass. There is peripheral enhancement of the mass on post contrast   images with central area of hypointensity which may represent necrosis.   There is minimal diffusion restriction present. No abnormal meningeal   enhancement is noted.    There is no hydrocephalus with minimal rightward midline shift. No   intracranial hemorrhage is present. Scattered intraparenchymal and   periventricular increased T2 signal representing chronic ischemic   changes. Flow voids are seen within the major intracranial vessels   consistent with their patency.    The visualized paranasal sinuses and mastoid air cells are clear.  The   orbits, sellar and suprasellar structures, and craniocervical junction   are unremarkable.    IMPRESSION:    Heterogenous-appearing mass in the left lateral temporal lobe with   surrounding vasogenic edema. There is minimal rightward midline shift   with no hydrocephalus or intraparenchymal hemorrhage present.      < end of copied text > HPI:  89Y M with PMH prostate cancer s/p seeding presents with word finding difficulty for the past 2 weeks. Family endorses difficulty reading and gait instability over the past 2 weeks as well. CTH revealed  irregular heterogeneous mass lesion in the inferolateral left temporal lobe with diffuse and edema and mass effect, with minimal 4 mm rightward shift. Primary neoplasm vs metastatic lesion. MRI brain confirmed L lateral temporal lobe mass measuring 2.8 x3.7 x 3.0cm with surrounding vasogenic edema and areas of punctuate hemorrhage within the mass. No acute surgical intervention offered per Neurosurgery, however patient was put on keppra 500mg BID prophylactically and decadron 2mg IV BID. Pan-scan for primary lesion negative. Denies headache, nausea, vomiting, dizziness, weakness.  Neurology consulted for expressive aphasia.       MEDICATIONS  (STANDING):  atorvastatin 10 milliGRAM(s) Oral at bedtime  dexamethasone  Injectable 2 milliGRAM(s) IV Push every 12 hours  levETIRAcetam 500 milliGRAM(s) Oral two times a day  pantoprazole    Tablet 40 milliGRAM(s) Oral before breakfast      PAST MEDICAL & SURGICAL HISTORY:  Prostate cancer  Anxiety and depression  S/P appendectomy  S/P tonsillectomy    FAMILY HISTORY:    Allergies    No Known Allergies    Intolerances        SHx - No smoking, No ETOH, No drug abuse    Review of Systems:  A 10 system ROS was performed and negative except for those mentioned in HPI    Vital Signs Last 24 Hrs  T(C): 36.3 (01 Sep 2019 04:56), Max: 36.4 (31 Aug 2019 14:20)  T(F): 97.3 (01 Sep 2019 04:56), Max: 97.6 (31 Aug 2019 21:39)  HR: 70 (01 Sep 2019 09:33) (65 - 72)  BP: 123/78 (01 Sep 2019 09:33) (113/68 - 127/72)  BP(mean): --  RR: 18 (01 Sep 2019 04:56) (18 - 18)  SpO2: 100% (01 Sep 2019 09:33) (95% - 100%)    General Exam:   General appearance: No acute distress                   Neurological Exam:  Mental Status: Orientated to self, date and place.  Good fund of knowledge and comprehension, recall and naming intact. Able to spell WORLD backwards, serial 7 subtraction intact. Mild expressive aphasia as he had difficulty finding the word for "strawberry" and a few other words, otherwise speech fluent.     No dysarthria, aphasia or neglect.      Cranial Nerves: CN I - not tested.  PERRL, EOMI, VFF, no nystagmus or diplopia. No facial asymmetry.  Hearing intact to finger rub bilaterally.     Motor:   Tone: normal.                  Strength:     [] Upper extremity                      Delt       Bicep    Tricep                                                  R         5/5        5/5        5/5       5/5                                               L          5/5        5/5        5/5       5/5  [] Lower extremity                       HF          KE          KF        DF         PF                                               R        5/5        5/5        5/5       5/5       5/5                                               L         5/5        5/5       5/5       5/5        5/5  Pronator drift: none                 Dysmetria: BL NL FTN  No truncal ataxia.    Tremor: No resting, postural or action tremor.  No myoclonus.    Sensation: intact to light touch, pinprick, vibration and proprioception    Deep Tendon Reflexes:   Right 2+ : BC, TC, BRD   Left 2+ : BC, TC, BRD  Right 2+ Knee, 1+ ankle  Left 2+ Knee, 1+ ankle    Toes flexor bilaterally  Gait: normal and stable, slightly wide based stance.      Other:    09-01    142  |  106  |  25<H>  ----------------------------<  134<H>  4.4   |  26  |  1.01    Ca    9.3      01 Sep 2019 07:17  Phos  3.1     09-01  Mg     2.3     09-01    TPro  6.1  /  Alb  3.9  /  TBili  0.3  /  DBili  x   /  AST  17  /  ALT  22  /  AlkPhos  88  09-01 09-01    142  |  106  |  25<H>  ----------------------------<  134<H>  4.4   |  26  |  1.01    Ca    9.3      01 Sep 2019 07:17  Phos  3.1     09-01  Mg     2.3     09-01    TPro  6.1  /  Alb  3.9  /  TBili  0.3  /  DBili  x   /  AST  17  /  ALT  22  /  AlkPhos  88  09-01                          12.8   11.7  )-----------( 230      ( 31 Aug 2019 15:35 )             38.3       Radiology    < from: CT Head No Cont (08.30.19 @ 11:00) >  EXAM:  CT BRAIN                            PROCEDURE DATE:  08/30/2019            INTERPRETATION:  CLINICAL INDICATION: Mental status change, confusion,   delirium, altered loss of consciousness, nonunion-year-old male,   depression, several week speech difficulty and word finding difficulty,    Technique: Noncontrast CT of the head was performed.    Multiple contiguous axial images were acquired from the skull base to the   vertex without the administration of intravenous contrast. Coronal and  sagittal reformations were made.    COMPARISON: None.    FINDINGS:    There is an irregular heterogeneous mass lesion partially seen in the   inferolateral left temporal lobe, (2:14), with decreased attenuation,   edema, mass effect and the frontal, temporal, and parietal lobes, with   extension along the left posterior limb of the internal capsule, and the   left posterior external capsule, concerning for underlying neoplasm,   additional regions of ischemic change also not excluded, strongly suggest   improved assessment using MRI with gadolinium.    There is edema, mass effect, effacement of the left cerebral hemisphere   with mild 4 mm rightward shift and slight right uncal herniation (2:13).   There is volume loss, with enlarged ventricles and sulci with with   nonspecific white matter decreased aeration, likely microvascular disease   with multiple remote lacunar infarcts. There is atherosclerotic vascular   calcification of the carotid siphons there is mineralization/calcination   the globus pallidus. Basal cisterns appear patent.    There are absent orbital lenses with previous scattered surgery. There is   mucosal thickening, retention cysts/polyps in the right maxillary sinus.   Mastoids are free of acute disease. Calvarium is intact with patchy   lucency and sclerosis of the clivus. Dental hardware noted in situ    IMPRESSION:    Irregular heterogeneous mass lesion in the inferolateral left temporal   lobe with diffuse and edema and mass effect, with minimal 4 mm rightward   shift, underlying areas of ischemia also not excluded, suggest MR with   gadolinium for improved assessment. Volume loss with microvascular   disease, no acute hemorrhage, basal stent are patent.    Findings discussed with Dr. Heath in the emergency department at   immediate time of review on 8/30/2019 at 11:14 AM.    < from: MR Head w/wo IV Cont (08.31.19 @ 12:23) >  EXAM:  MR BRAIN WAW IC                            PROCEDURE DATE:  08/31/2019            INTERPRETATION:  CLINICAL INFORMATION: Dysphasia. Memory loss. Brain mass   on CT head 8/30/2019    TECHNIQUE: MRI of the brain was performed with and without contrast.   Sagittal and axial T1, axial T2, FLAIR, SWI, diffusion-weighted images   and an ADC map were obtained.    6 cc of Gadavist was injected, with 1.5 cc discarded.    COMPARISON: CT head 8/30/2019    FINDINGS:    As on CT head 8/30/2019, thereis a heterogenous appearing mass in the   left lateral temporal lobe measuring 2.8 x 3.7 x 3 cm (AP, CC, TRV).   There is surrounding vasogenic edema in the temporal and parietal lobes.   There are areas of of punctate hemorrhage versus mineralization within   the mass. There is peripheral enhancement of the mass on post contrast   images with central area of hypointensity which may represent necrosis.   There is minimal diffusion restriction present. No abnormal meningeal   enhancement is noted.    There is no hydrocephalus with minimal rightward midline shift. No   intracranial hemorrhage is present. Scattered intraparenchymal and   periventricular increased T2 signal representing chronic ischemic   changes. Flow voids are seen within the major intracranial vessels   consistent with their patency.    The visualized paranasal sinuses and mastoid air cells are clear.  The   orbits, sellar and suprasellar structures, and craniocervical junction   are unremarkable.    IMPRESSION:    Heterogenous-appearing mass in the left lateral temporal lobe with   surrounding vasogenic edema. There is minimal rightward midline shift   with no hydrocephalus or intraparenchymal hemorrhage present.      < end of copied text >

## 2019-09-01 NOTE — PROGRESS NOTE ADULT - PROBLEM SELECTOR PLAN 2
-likely 2/2 to mass effect caused by brain mass apparent on CTH  -fall precautions
-likely 2/2 to mass effect caused by brain mass apparent on CTH  -fall precautions

## 2019-09-01 NOTE — DISCHARGE NOTE PROVIDER - INSTRUCTIONS
Steroids can cause upset stomach. Please taker antiacid (protonix) every morning before breakfast while on steroids

## 2019-09-01 NOTE — CONSULT NOTE ADULT - ASSESSMENT
John Queen  89M high functioning presents for WFD found to have L temporal mass, intact except AOx2 and mild receptive aphasia  - No acute neurosurgical intervention  - MRI brain wwo  - Medicine eval for metastatic workup  - Keppra 500mg BID  - q4h Neurochecks
Assessment:  89Y M with PMH prostate cancer s/p seeding presents with word finding difficulty for the past 2 weeks. Found to have L lateral temporal lobe mass measuring 2.8 x3.7 x 3.0cm with surrounding vasogenic edema and midline shift on MRI concerning for GBM. Mild expressive aphasia on exam, no motor or sensory weakness.     Impression:  Mild expressive aphasia likely in the setting of L temporal lobe mass concerning for GBM    Recommend:  - continue Keppra 500mg BID for seizure prophylaxis  - given that steroids were started, can continue decadron 2mg BID for vasogenic edema as it appears to have helped him symptomatically   - outpatient follow up with Oncology and/or Palliative care     D/w Neuro attending, Dr. Bartlett

## 2019-09-01 NOTE — PROGRESS NOTE ADULT - SUBJECTIVE AND OBJECTIVE BOX
Authored by Dr Shai Churchill [phone] [facility] / [id number] [facility]    Patient is a 89y old  Male who presents with a chief complaint of Brain mass (31 Aug 2019 07:01)    SUBJECTIVE / OVERNIGHT EVENTS: No acute events overnight    This morning pt endorses word finding difficulties at times, but otherwise feels well and requests to "go home." Pt talks at length about his exercises he does to stay healthy       MEDICATIONS  (STANDING):  atorvastatin 10 milliGRAM(s) Oral at bedtime  dexamethasone  Injectable 2 milliGRAM(s) IV Push every 12 hours  enoxaparin Injectable 30 milliGRAM(s) SubCutaneous daily  levETIRAcetam 500 milliGRAM(s) Oral two times a day  pantoprazole    Tablet 40 milliGRAM(s) Oral before breakfast    MEDICATIONS  (PRN):      Vital Signs Last 24 Hrs  T(C): 36.3 (01 Sep 2019 04:56), Max: 36.4 (31 Aug 2019 14:20)  T(F): 97.3 (01 Sep 2019 04:56), Max: 97.6 (31 Aug 2019 21:39)  HR: 65 (01 Sep 2019 04:56) (65 - 72)  BP: 123/70 (01 Sep 2019 04:56) (113/68 - 127/72)  BP(mean): --  RR: 18 (01 Sep 2019 04:56) (18 - 18)  SpO2: 97% (01 Sep 2019 04:56) (95% - 97%)  CAPILLARY BLOOD GLUCOSE        I&O's Summary    31 Aug 2019 07:01  -  01 Sep 2019 07:00  --------------------------------------------------------  IN: 600 mL / OUT: 600 mL / NET: 0 mL        PHYSICAL EXAM  GENERAL: NAD, well-developed, resting comfortably in bed  HEAD:  Atraumatic, Normocephalic  EYES: EOMI, PERRLA, conjunctiva and sclera clear  NECK: Supple  CHEST/LUNG: Clear to auscultation bilaterally; No wheeze  HEART: Regular rate and rhythm; No murmurs, rubs, or gallops  ABDOMEN: Soft, Nontender, Nondistended; Bowel sounds present  EXTREMITIES:  2+ Peripheral Pulses, No clubbing, cyanosis, or edema  PSYCH: AAOx3 (name, "[facility]," "2011," "thing inside my head")  NEUROLOGY:  Motor strength 5/5 upper and lower extremities bilaterally. Mild aphasia and dysarthria.  SKIN: No rashes or lesions    LABS:                        12.8   11.7  )-----------( 230      ( 31 Aug 2019 15:35 )             38.3     -    142  |  106  |  25<H>  ----------------------------<  134<H>  4.4   |  26  |  1.01    Ca    9.3      01 Sep 2019 07:17  Phos  3.1       Mg     2.3         TPro  6.1  /  Alb  3.9  /  TBili  0.3  /  DBili  x   /  AST  17  /  ALT  22  /  AlkPhos  88            Urinalysis Basic - ( 30 Aug 2019 13:18 )    Color: Light Yellow / Appearance: Clear / S.018 / pH: x  Gluc: x / Ketone: Negative  / Bili: Negative / Urobili: Negative   Blood: x / Protein: Trace / Nitrite: Negative   Leuk Esterase: Negative / RBC: 1 /hpf / WBC 0 /HPF   Sq Epi: x / Non Sq Epi: 0 /hpf / Bacteria: Negative        Culture - Urine (collected 30 Aug 2019 16:40)  Source: .Urine  Final Report (31 Aug 2019 12:39):    No growth        RADIOLOGY & ADDITIONAL TESTS:    Imaging Personally Reviewed:  Consultant(s) Notes Reviewed:    Care Discussed with Consultants/Other Providers:
Patient is a 89y old  Male who presents with a chief complaint of Brain mass (31 Aug 2019 07:01)      SUBJECTIVE / OVERNIGHT EVENTS: Patient seen and examined at bedside after no acute events overnight. Patient denies pain, n/v/f/c/cp/sob, lightheadedness, dizziness, vertigo, fecal or urinary incontinence, vision or hearing loss/changes, weaknes, tingling. Says he's here for memory problems. Patient occasionally struggles with finding words.     MEDICATIONS  (STANDING):  atorvastatin 10 milliGRAM(s) Oral at bedtime  dexamethasone  Injectable 2 milliGRAM(s) IV Push every 12 hours  enoxaparin Injectable 30 milliGRAM(s) SubCutaneous daily  levETIRAcetam 500 milliGRAM(s) Oral two times a day  pantoprazole    Tablet 40 milliGRAM(s) Oral before breakfast    MEDICATIONS  (PRN):      Vital Signs Last 24 Hrs  T(C): 36.3 (31 Aug 2019 05:00), Max: 37.1 (30 Aug 2019 09:59)  T(F): 97.4 (31 Aug 2019 05:00), Max: 98.7 (30 Aug 2019 09:59)  HR: 73 (31 Aug 2019 05:00) (72 - 74)  BP: 114/63 (31 Aug 2019 05:00) (102/57 - 121/78)  BP(mean): --  RR: 18 (31 Aug 2019 05:00) (18 - 18)  SpO2: 98% (31 Aug 2019 05:00) (97% - 99%)  CAPILLARY BLOOD GLUCOSE      POCT Blood Glucose.: 100 mg/dL (30 Aug 2019 10:16)    I&O's Summary      PHYSICAL EXAM:  GENERAL: NAD, well-developed  HEAD:  Atraumatic, Normocephalic  EYES: EOMI, PERRLA, conjunctiva and sclera clear  NECK: Supple  CHEST/LUNG: Clear to auscultation bilaterally; No wheeze  HEART: Regular rate and rhythm; No murmurs, rubs, or gallops  ABDOMEN: Soft, Nontender, Nondistended; Bowel sounds present  EXTREMITIES:  2+ Peripheral Pulses, No clubbing, cyanosis, or edema  PSYCH: AAOx3  NEUROLOGY: CN II-XII intact. Motor strength 5/5 upper and lower extremities bilaterally. Mild aphasia and dysarthria.  SKIN: No rashes or lesions    LABS:                        12.7   8.2   )-----------( 220      ( 31 Aug 2019 07:23 )             38.8         139  |  104  |  22  ----------------------------<  144<H>  4.2   |  25  |  1.00    Ca    9.1      31 Aug 2019 07:15  Phos  3.3       Mg     2.2         TPro  6.2  /  Alb  3.9  /  TBili  0.7  /  DBili  x   /  AST  16  /  ALT  13  /  AlkPhos  73            Urinalysis Basic - ( 30 Aug 2019 13:18 )    Color: Light Yellow / Appearance: Clear / S.018 / pH: x  Gluc: x / Ketone: Negative  / Bili: Negative / Urobili: Negative   Blood: x / Protein: Trace / Nitrite: Negative   Leuk Esterase: Negative / RBC: 1 /hpf / WBC 0 /HPF   Sq Epi: x / Non Sq Epi: 0 /hpf / Bacteria: Negative        RADIOLOGY & ADDITIONAL TESTS:    Imaging Personally Reviewed:    Consultant(s) Notes Reviewed:      Care Discussed with Consultants/Other Providers:    Dutch Mitchell, PGY-1; [facility] Pager: [phone]

## 2019-09-01 NOTE — PROGRESS NOTE ADULT - PROBLEM SELECTOR PLAN 1
New onset, progressively worse aphasia for 2 weeks, CTH showing L temporal lobe mass with diffuse and edema and mass effect, with minimal 4 mm rightward   shift.  -primary neoplasm vs metastasis  -patient hemodynamically stable, afebrile, UA negative, CXR clear   -Neurosurgery evaluated patient in ED, deferring acute intervention  -s/p MRI w/wout contrast  -order CT CAP with contrast if MRI suggests metastatic lesion, discuss with family/pt -loaded with 1g Keppra, c/w PO Keppra 500 BID  -neuro checks q4h  -fall precautions, seizure precautions  -decadron  IV 2mg q 12h given his age and possible  psychosis
New onset, progressively worse aphasia for 2 weeks, CTH showing L temporal lobe mass with diffuse and edema and mass effect, with minimal 4 mm rightward   shift.  -primary neoplasm vs metastasis  -patient hemodynamically stable, afebrile, UA negative, CXR clear   -Neurosurgery evaluated patient in ED, deferring acute intervention  -f/u MRI w/wout contrast  -order CT CAP with contrast if MRI suggests metastatic lesion, discuss with family/pt -loaded with 1g Keppra, c/w PO Keppra 500 BID  -neuro checks q4h  -fall precautions, seizure precautions  -decadron 4mg IV now, then start IV 2mg q 12h given his age and possible  psychosis

## 2019-09-01 NOTE — PROGRESS NOTE ADULT - ASSESSMENT
The patient is a 89 y/o man with hx prostate cancer s/p seeding (remote) presents with word finding difficulty for two weeks; CT head revealed irregular heterogeneous mass lesion in the inferolateral left temporal lobe with diffuse and edema and mass effect, with minimal 4 mm rightward shift. Primary neoplasm vs metastatic lesion.
The patient is a 89 y/o man with hx prostate cancer s/p seeding (remote) presents with word finding difficulty for two weeks; CT head revealed irregular heterogeneous mass lesion in the inferolateral left temporal lobe with diffuse and edema and mass effect, with minimal 4 mm rightward shift. Primary neoplasm vs metastatic lesion.

## 2019-09-01 NOTE — PHYSICAL THERAPY INITIAL EVALUATION ADULT - ADDITIONAL COMMENTS
Lives with his spouse Terri in a private home, +4 steps to enter. Patient states that he is independent with ADL's and does not use any devices to ambulate. Lives with his spouse Terri in a private home, +4 steps to enter. Patient states that he is independent with ADL's and does not use any devices to ambulate. Prior to admission, pt was very active at [facility], exercised daily.

## 2019-09-01 NOTE — DISCHARGE NOTE PROVIDER - NSDCFUSCHEDAPPT_GEN_ALL_CORE_FT
PATEL SPAIN ; 09/11/2019 ; [facility] [address] [place] [address] PATEL SPAIN ; 09/11/2019 ; [facility] [address] [place] [address] PATEL SPAIN ; 09/11/2019 ; [facility] [address] [place] [address] PATEL SPAIN ; 09/11/2019 ; [facility] [address] [place] [address] PATEL SPAIN ; 09/11/2019 ; [facility] [address] [place] [address]

## 2019-09-01 NOTE — DISCHARGE NOTE NURSING/CASE MANAGEMENT/SOCIAL WORK - PATIENT PORTAL LINK FT
You can access the FollowMyHealth Patient Portal offered by [facility] by registering at the following website: [email or web address]. By joining [facility]’s FollowMyHealth portal, you will also be able to view your health information using other applications (apps) compatible with our system.

## 2019-09-01 NOTE — DISCHARGE NOTE PROVIDER - PROVIDER TOKENS
PROVIDER:[TOKEN:[[id number]]] PROVIDER:[TOKEN:[[id number]]],PROVIDER:[TOKEN:[[id number]]],PROVIDER:[TOKEN:[[id number]]]

## 2019-09-01 NOTE — PROGRESS NOTE ADULT - ATTENDING COMMENTS
steroid for cerebral edema.  f/up neuro recs.  if no further inpatient work up suggested, may discharge tomorrow with outpt neurology/neurosurgery f/up.  AEDs per neuro  overall prognosis guarded given brain mass.  patient very eager to go home.    Ankur Cao MD, MHA, FACP, [facility]  Pager: [phone]

## 2019-09-01 NOTE — PROGRESS NOTE ADULT - PROBLEM SELECTOR PLAN 3
-pt takes zoloft every 2 days at home, PCP weaning off  -held because of interaction with Keppra
-pt takes zoloft every 2 days at home, PCP weaning off  -held because of interaction with Keppra

## 2019-09-01 NOTE — DISCHARGE NOTE PROVIDER - HOSPITAL COURSE
Mr. Queen is a 89y/o Male with PMHx of prostate cancer s/p seeding who presented with word finding difficulty for the past 2 weeks. Family endorses difficulty reading and gait instability over the past 2 weeks as well. CTH revealed  irregular heterogeneous mass lesion in the inferolateral left temporal lobe with diffuse and edema and mass effect, with minimal 4 mm rightward shift. Primary neoplasm vs metastatic lesion. MRI brain confirmed L lateral temporal lobe mass measuring 2.8 x3.7 x 3.0cm with surrounding vasogenic edema and areas of punctuate hemorrhage within the mass. No acute surgical intervention offered per Neurosurgery, however patient was put on keppra 500mg BID prophylactically and decadron 2mg IV BID. Pan-scan for primary lesion negative. Based on discussion with pt, his wife and daughter, further inpatient workup was defered and pt to be discharge home.        Pt is currently stable for discharge to home with home PT and neurosurgery followup.

## 2019-09-01 NOTE — PHYSICAL THERAPY INITIAL EVALUATION ADULT - PERTINENT HX OF CURRENT PROBLEM, REHAB EVAL
89 y/o M with hx prostate cancer s/p seeding (remote) p/w word finding difficulty for two weeks. Per patient and family, pt also has been having trouble reading, gait instability. CT head revealed irregular heterogeneous mass lesion in the inferolateral left temporal lobe with diffuse and edema and mass effect, with minimal 4 mm rightward shift. Primary neoplasm vs metastatic lesion.

## 2019-09-01 NOTE — DISCHARGE NOTE PROVIDER - CARE PROVIDERS DIRECT ADDRESSES
,[email or web address]

## 2019-09-04 PROBLEM — C61 MALIGNANT NEOPLASM OF PROSTATE: Chronic | Status: ACTIVE | Noted: 2019-01-01

## 2019-09-10 PROBLEM — Z78.9 NON-SMOKER: Status: ACTIVE | Noted: 2019-01-01

## 2019-09-10 NOTE — ASSESSMENT
[FreeTextEntry1] : Assess:\par Brain Mass\par Suspicion for malignancy\par \par PLAN:\par Surgery and biopsy discussed\par Risks explained with surgical intervention\par Patient and family understood and opted to follow the mass conservative \par maintain Keppra \par Follow up with Neurology\par Recommended a consult with Dr Camara for informative purposes

## 2019-09-10 NOTE — HISTORY OF PRESENT ILLNESS
[< 3 months] : less than 3 months [FreeTextEntry1] : aphasia [de-identified] : 89 year old male who presents with a history of aphasia and garbled speech over one week ago.  He was taken for neuroimaging and a brain mass was noted.  There wa  brain MRI done showing a left temporall obe mass indicating high suspicion for malignancy.  He is back to baseline with no further episodes of aphasia.

## 2019-09-10 NOTE — REASON FOR VISIT
[New Patient Visit] : a new patient visit [Family Member] : family member [Referred By: _________] : Patient was referred by SHANE [FreeTextEntry1] : Newly diagnosed brain mass with aphasia

## 2019-09-13 PROBLEM — R41.0 CONFUSION: Status: ACTIVE | Noted: 2019-01-01

## 2019-09-13 PROBLEM — G93.89 BRAIN MASS: Status: ACTIVE | Noted: 2019-01-01

## 2019-09-19 NOTE — DISCUSSION/SUMMARY
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 I tapered him off the keppra.\par \par LEFT HANDEDNESS -- Given his lack of significant symptomatology and the family history of first degree relatives who are also left handed, he is probably right brain dominant.\par \par CEREBRAL EDEMA – Given the adverse effects of decadron (insomnia, proximal myopathy, diabetes, behavioral changes), I think Avastin is a better drug to control his edema. I discussed the risks (heart attack, blood clots, pulmonoary embolism, stroke) and the benefits (improved language and quality of life), cautioning that this drug does not prolong life, it only improves quality of life. He would like to proceed with this medication.\par \par GLIOBLASTOMA – The imaging is pathognomonic. It is remotely possible he has a gliosarcoma or oligodendroglioma, but not a metastasis, as a CT_c/a/p while in house demonstrated no primary and prostate cancer does not metastasize to the brain without concurrent lung metastases. I think radiation would have significant side effects and is more likely to impair quality of life than to prolong it. Temozolomide is not unreasonable, as adverse effects are uncommon (about 5%) and it can prolong life. I discussed this intervention, anticipated side effects and benefits in detail. He wishes to proceed.\par \par DISPO – They know to call me after receiving the Temozolomide. I would also like to see him in 2 months’ time with a repeat brain MRI.\par

## 2019-09-19 NOTE — HISTORY OF PRESENT ILLNESS
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 Was evaluated by Dr Bartlett on 9/1/19.\par \par Briefly, his daughter called and spoke with me last week. She informed me that there is a strong family history for left handedness, including her daughter and several male relations.\par \par On careful and detailed questioning, he has never had a seizure, nor any seizure-like activity.\par \par His behavior worsened while on the decadron, which was stopped on 9/11/19. \par \par He has a remote history of prostate cancer.\par \par He is remarkably active, considering his age. He performs the [employer] exercises on [place] and attends meeting of elderly, retired professionals (“REAL”) on Tuesdays every two weeks or so. He identifies his wife as his HCP, with his daughter as backup. His wife can be reached at [phone].\par He informs me that he does not want to be resuscitated, should his heart stop. He has completed DNR paperwork.\par \par He was evaluated by Dr Chang (Neurosurgery) while in house and no surgical intervention was felt to be appropriate.\par He feels his thinking was faster on the steroids. His daughter is concerned that this medication caused insomnia and agitation. She is also concerned about his behavior with her elderly mother even off the steroids, although he is not violent, he is demanding.\par \par He denies headaches, nausea/vomiting, weakness, visual changes. He is hard of hearing and has occasional word finding difficulties.\par

## 2019-09-19 NOTE — PHYSICAL EXAM
[General Appearance - Alert] : alert [General Appearance - In No Acute Distress] : in no acute distress [Oriented To Time, Place, And Person] : oriented to person, place, and time [Impaired Insight] : insight and judgment were intact [Affect] : the affect was normal [Person] : oriented to person [Place] : oriented to place [Time] : oriented to time [Concentration Intact] : normal concentrating ability [Visual Intact] : visual attention was ~T not ~L decreased [Naming Objects] : no difficulty naming common objects [Repeating Phrases] : no difficulty repeating a phrase [Writing A Sentence] : no difficulty writing a sentence [Fluency] : fluency intact [Comprehension] : comprehension intact [Reading] : reading intact [Past History] : adequate knowledge of personal past history [Cranial Nerves Optic (II)] : visual acuity intact bilaterally,  visual fields full to confrontation, pupils equal round and reactive to light [Cranial Nerves Oculomotor (III)] : extraocular motion intact [Cranial Nerves Trigeminal (V)] : facial sensation intact symmetrically [Cranial Nerves Facial (VII)] : face symmetrical [Cranial Nerves Vestibulocochlear (VIII)] : hearing was intact bilaterally [Cranial Nerves Glossopharyngeal (IX)] : tongue and palate midline [Cranial Nerves Accessory (XI - Cranial And Spinal)] : head turning and shoulder shrug symmetric [Cranial Nerves Hypoglossal (XII)] : there was no tongue deviation with protrusion [Motor Tone] : muscle tone was normal in all four extremities [Motor Strength] : muscle strength was normal in all four extremities [No Muscle Atrophy] : normal bulk in all four extremities [Motor Handedness Left-Handed] : the patient is left hand dominant [Paresis Pronator Drift Right-Sided] : no pronator drift on the right [Paresis Pronator Drift Left-Sided] : no pronator drift on the left [Motor Strength Upper Extremities Bilaterally] : strength was normal in both upper extremities [Motor Strength Lower Extremities Bilaterally] : strength was normal in both lower extremities [Sensation Tactile Decrease] : light touch was intact [Abnormal Walk] : normal gait [Balance] : balance was intact [Past-pointing] : there was no past-pointing [Tremor] : no tremor present [1+] : Patella left 1+ [0] : Ankle jerk left 0 [Plantar Reflex Right Only] : normal on the right [Plantar Reflex Left Only] : normal on the left [FreeTextEntry4] : rare word finding challenges.

## 2019-09-19 NOTE — DATA REVIEWED
[de-identified] : I personally reviewed MR imaging dated\par 8/31/19			\par \par In reviewing these images, I find MODERATELY SEVERE SOURROUNDING HYPERINTENSITY on the T2 weighted images, with signal change likely representing an admixture of cerebral edema and high grade neoplasm. \par I am concerned about THE ELOQUENCE OF THE CORTEX INVOLVED.\par On the contrast enhanced images, there is [id number] MODERATELY LARGE MASS LESION manifested as abnormal left anterior lateral temporal lobe enhancement.\par Overall I find the images to be c/w high grade glioma, like a glioblastoma, although gliosarcoma is possible. \par Selected imaging was provided to the patient, along with a detailed verbal explanation of the issues at hand as outlined above.\par

## 2019-09-30 NOTE — ED ADULT NURSE NOTE - NSIMPLEMENTINTERV_GEN_ALL_ED
Implemented All Fall with Harm Risk Interventions:  [place] to call system. Call bell, personal items and telephone within reach. Instruct patient to call for assistance. Room bathroom lighting operational. Non-slip footwear when patient is off stretcher. Physically safe environment: no spills, clutter or unnecessary equipment. Stretcher in lowest position, wheels locked, appropriate side rails in place. Provide visual cue, wrist band, yellow gown, etc. Monitor gait and stability. Monitor for mental status changes and reorient to person, place, and time. Review medications for side effects contributing to fall risk. Reinforce activity limits and safety measures with patient and family. Provide visual clues: red socks.

## 2019-09-30 NOTE — ED PROVIDER NOTE - NSFOLLOWUPINSTRUCTIONS_ED_ALL_ED_FT
Follow up at your regularly scheduled appointments regarding any infusions or therapies. Follow up with your regular doctor and neurosurgeon at your regularly scheduled appointments.    Please call or see your doctor or return to the ER if you have new chest pain, shortness of breath, fevers, inability to eat or drink, or worsening of symptoms that brought you to the emergency room today.

## 2019-09-30 NOTE — ED PROVIDER NOTE - CLINICAL SUMMARY MEDICAL DECISION MAKING FREE TEXT BOX
89 yo M presenting after a fall, will obtain imaging/labs/UA given possibel incontinence, on exam no obvious signs of trauma. P 89 yo M presenting after a fall, will obtain imaging/labs/UA given possibel incontinence, on exam no obvious signs of trauma. P  ATTG: : fall with recent dx of GBM. check ct head, check ct spine, pain medication, labs, re eval for dispo

## 2019-09-30 NOTE — ED ADULT NURSE NOTE - OBJECTIVE STATEMENT
89YOM [facility] brain tumore presents to ED s/p unwitnessed fall this morning, wife called EMS because she could not get him up. Pt walked to the bathroom and he states he slipped, but pt not a good historian. Pt has increasing aphasia due to tumor. Pt denies pain. No obvious signs of trauma. Denies CP, SOBm fever, chills, abd pain, N/V/D, burning upon urination. Safety and comfort measures provided. Will continue to monitor,

## 2019-09-30 NOTE — ED PROVIDER NOTE - OBJECTIVE STATEMENT
89 yo M hx recent brain mass scheduled to start tx today presents after unwitnessed fall at home several hours ago, called his wife to help who activated EMS. Pt with recent increasing aphasia and is not a reliable historian. Per his wife he was found on floor in bathroom shortly after going there to urinate. Per her, he appeared to have slipped onto his backside w/o obvious signs of trauma to the head. He had not been c/o recent f/c, n/v/d, had been eating/drinking normally, no recent blood in stool/urine. Has hx of R sided facial asymmetry, notes is not new per daughter.         Daughter at bedside notes pt is DNR/DNI, MOLST form at home 89 yo M hx recent brain mass scheduled to start tx today presents after unwitnessed fall at home several hours ago, called his wife to help who activated EMS. Pt with recent increasing aphasia and is not a reliable historian. Per his wife he was found on floor in bathroom shortly after going there to urinate. Per her, he appeared to have slipped onto his backside w/o obvious signs of trauma to the head. Did not appear to have LoC/was alert upon being found by his wife. He had not been c/o recent f/c, n/v/d, had been eating/drinking normally, no recent blood in stool/urine. Has hx of R sided facial asymmetry, notes is not new per daughter.          Daughter at bedside notes pt is DNR/DNI, MOLST form at home

## 2019-09-30 NOTE — ED PROVIDER NOTE - ATTENDING CONTRIBUTION TO CARE
89 y/o m with pmhx GBM (recently diagnosed) presents by EMS for fall from home. was going to the bathroom and fell on wet surface. called his wife who attempted to get him up and hurt her back. Umknown if head trauma. patient does not offer any complaints. Per his wife he was found on floor in bathroom shortly after going there to urinate. Per her, he appeared to have slipped onto his backside w/o obvious signs of trauma to the head.  prior to fall was in usual state of health.   GCS: 13 opens eyes on commands, moves ext on commands.  Primary Survey  Secondary Survey:  Gen:  No respiratory Distress / no distress from pain  HEENT: pupils   EOMI  No [place] Eyes /  Ceballos Sign    Neck: C - spine   Lungs: breath sounds: b/l   CVS: S1S2,    Distal Pulses: 2+ DP  Abd: soft non tender  Back: no midline tenderness or step off  Extremities: no deformity  MSK: strength:  Neuro: awake, alert, follows command, clear speech

## 2019-09-30 NOTE — ED ADULT TRIAGE NOTE - CHIEF COMPLAINT QUOTE
Fell out of bed, called out to wife who tried to help him up but could not  Unknown if hit head, not on blood thinners  Recently diagnosed with brain CA, due for first treatment  today and a surgery tomorrow

## 2019-09-30 NOTE — ED PROVIDER NOTE - PHYSICAL EXAMINATION
General: No apparent distress.  Head: Normocephalic and atraumatic. R eyelid droop noted.  Eyes: PERRLA with EOMI.  Neck: Supple. Trachea midline.   Cardiac: Normal S1 and S2  Pulmonary: Vesicular breath sounds bilaterally. No increased WOB. No wheezes or crackles.  Abdominal: Soft, non-tender.   Neurologic: A&O x1. CN 2-12 intact. Strength 5/5 throughout b/l UE and LE. Sensation intact to light touch throughout b/l UE and LE. Finger to nose intact. Gait wnl.   Musculoskeletal: Strength appropriate in all 4 extremities for age with no limited ROM.  Skin: Color appropriate for race. Intact, warm, and well-perfused.  Psychiatric: Appropriate mood and affect. No apparent risk to self or others. General: No apparent distress.  Head: Normocephalic and atraumatic. R eyelid droop noted.  Eyes: PERRLA with EOMI.  Neck: Supple. Trachea midline.   Cardiac: Normal S1 and S2  Pulmonary: Vesicular breath sounds bilaterally. No increased WOB. No wheezes or crackles.  Abdominal: Soft, non-tender.   Neurologic: A&O x1. CN 2-12 intact. Strength 5/5 throughout b/l UE and LE. Sensation intact to light touch throughout b/l UE and LE. Finger to nose intact. Gait wnl.   Musculoskeletal: Strength appropriate in all 4 extremities for age with no limited ROM. No vertebral stepoffs or tenderness, all compartments soft on UE and LE, able to ambulate with normal gait per daughter, No bony chest wall or pelvic tenderness.  Skin: Color appropriate for race. Intact, warm, and well-perfused.

## 2019-09-30 NOTE — ED PROVIDER NOTE - PATIENT PORTAL LINK FT
You can access the FollowMyHealth Patient Portal offered by [facility] by registering at the following website: [email or web address]. By joining [facility]’s FollowMyHealth portal, you will also be able to view your health information using other applications (apps) compatible with our system.

## 2019-10-21 PROBLEM — R26.89 IMBALANCE: Status: ACTIVE | Noted: 2019-01-01

## 2019-10-21 PROBLEM — Z23 NEED FOR INFLUENZA VACCINATION: Status: ACTIVE | Noted: 2018-12-26

## 2019-10-21 PROBLEM — Z91.89 DRIVING SAFETY ISSUE: Status: RESOLVED | Noted: 2018-12-26 | Resolved: 2019-01-01

## 2019-11-07 NOTE — DATA REVIEWED
[de-identified] : I personally reviewed MR imaging dated\par 11/4/19	8/31/19		\par \par In reviewing these images, I find INTERVAL DECREASE OF THE LEFT ANTERIOR LATERAL TEMPORAL HYPERINTENSITY on the T2 weighted images, with signal change representing an admixture of cerebral edema AND neoplasm. \par I am concerned about PERSISTENT ENHANCEMENT DESPITE AVASTIN THERAPY.	\par On the contrast enhanced images, there is CENTRALLY NECROTIC-APPEARING RING enhancement IN THE LEFT ANTERIOR TEMPORAL LOBE THAT IS UNCHANGED (ALLOWING FOR TECHNIQUE) BETWEEN THESE STUDIES.\par Overall I find the images to be stable. \par Selected imaging was provided to the patient, along with a detailed verbal explanation of the issues at hand as outlined above.\par

## 2019-11-07 NOTE — DISCUSSION/SUMMARY
[FreeTextEntry1] : Brain tumor - Imaging reviewed with pt and family. His speech and mobility have markedly improved with Avastin, although he is still frustrated by the changes in his intellect and speech. He will continue Avastin infusions q2 weeks. He may resume his exercises and outdoor activities, as he always has an aide with him. They will meet with SW and explore other opportunities for social activities and possible speech/cognitive therapy. \par \par DISPO - All questions answered. Follow-up visit in 1 month.

## 2019-11-07 NOTE — PHYSICAL EXAM
[General Appearance - In No Acute Distress] : in no acute distress [General Appearance - Alert] : alert [Oriented To Time, Place, And Person] : oriented to person, place, and time [Affect] : the affect was normal [Impaired Insight] : insight and judgment were intact [Person] : oriented to person [Place] : oriented to place [Short Term Intact] : short term memory intact [Time] : oriented to time [Remote Intact] : remote memory intact [Span Intact] : the attention span was normal [Concentration Intact] : normal concentrating ability [Visual Intact] : visual attention was ~T not ~L decreased [Repeating Phrases] : no difficulty repeating a phrase [Fluency] : fluency intact [Comprehension] : comprehension intact [Past History] : adequate knowledge of personal past history [Reading] : reading intact [Cranial Nerves Optic (II)] : visual acuity intact bilaterally,  visual fields full to confrontation, pupils equal round and reactive to light [Cranial Nerves Oculomotor (III)] : extraocular motion intact [Cranial Nerves Facial (VII)] : face symmetrical [Cranial Nerves Trigeminal (V)] : facial sensation intact symmetrically [Cranial Nerves Glossopharyngeal (IX)] : tongue and palate midline [Cranial Nerves Accessory (XI - Cranial And Spinal)] : head turning and shoulder shrug symmetric [Cranial Nerves Hypoglossal (XII)] : there was no tongue deviation with protrusion [Motor Tone] : muscle tone was normal in all four extremities [Motor Strength] : muscle strength was normal in all four extremities [Involuntary Movements] : no involuntary movements were seen [No Muscle Atrophy] : normal bulk in all four extremities [Sensation Tactile Decrease] : light touch was intact [Balance] : balance was intact [Sclera] : the sclera and conjunctiva were normal [Extraocular Movements] : extraocular movements were intact [Skin Color & Pigmentation] : normal skin color and pigmentation [Respiration, Rhythm And Depth] : normal respiratory rhythm and effort [Past-pointing] : there was no past-pointing [Tremor] : no tremor present [Coordination - Dysmetria Impaired Finger-to-Nose Bilateral] : not present [Dysdiadochokinesia Bilaterally] : not present [FreeTextEntry5] : baseline [facility] [FreeTextEntry4] : occasional word finding difficulty  [FreeTextEntry8] : gait steady, small steps

## 2019-11-07 NOTE — HISTORY OF PRESENT ILLNESS
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 He was evaluated by Dr Chang (Neurosurgery) while in house and no surgical intervention was felt to be appropriate.\par \par Currently on  Avastin infusions q2 weeks, first infusion was 9/30/19. \par \par He presents today for routine follow up with new MRI, accompanied by his wife and daughter. \par \par They note great improvement in his speech and mobility since starting Avastin. \par He is tolerating the infusions well. \par \par He is frustrated by his speech difficulty and cognitive decline, that he is not able to do the same things he did in the past. He says that he wants to die because he is bored. He has trouble reading the newspaper and watching TV. \par They chose not to take temozolomide, as they do not want to prolong his life, but only to start Avastin to maintain quality of life. \par \par Off Keppra with no seizures or seizure-like symptoms. \par \par Off decadron. No headaches, nausea/vomiting, weakness, visual changes. \par \par Reports constipation, although he has a daily BM. \par \par They have HHA on a daily basis. \par \par

## 2019-12-04 PROBLEM — F32.9 DEPRESSION: Status: ACTIVE | Noted: 2018-02-12

## 2019-12-04 PROBLEM — K59.00 CONSTIPATION: Status: ACTIVE | Noted: 2019-01-01

## 2019-12-09 NOTE — REASON FOR VISIT
[Follow-Up: _____] : a [unfilled] follow-up visit [Spouse] : spouse [Family Member] : family member [Formal Caregiver] : formal caregiver [FreeTextEntry1] : brain tumor

## 2019-12-09 NOTE — PHYSICAL EXAM
[General Appearance - Alert] : alert [Impaired Insight] : insight and judgment were intact [Oriented To Time, Place, And Person] : oriented to person, place, and time [General Appearance - In No Acute Distress] : in no acute distress [Affect] : the affect was normal [Person] : oriented to person [Time] : oriented to time [Place] : oriented to place [Short Term Intact] : short term memory intact [Remote Intact] : remote memory intact [Concentration Intact] : normal concentrating ability [Span Intact] : the attention span was normal [Visual Intact] : visual attention was ~T not ~L decreased [Repeating Phrases] : no difficulty repeating a phrase [Comprehension] : comprehension intact [Reading] : reading intact [Past History] : adequate knowledge of personal past history [Cranial Nerves Optic (II)] : visual acuity intact bilaterally,  visual fields full to confrontation, pupils equal round and reactive to light [Cranial Nerves Oculomotor (III)] : extraocular motion intact [Cranial Nerves Facial (VII)] : face symmetrical [Cranial Nerves Trigeminal (V)] : facial sensation intact symmetrically [Cranial Nerves Accessory (XI - Cranial And Spinal)] : head turning and shoulder shrug symmetric [Cranial Nerves Glossopharyngeal (IX)] : tongue and palate midline [Motor Tone] : muscle tone was normal in all four extremities [Cranial Nerves Hypoglossal (XII)] : there was no tongue deviation with protrusion [Motor Strength] : muscle strength was normal in all four extremities [Involuntary Movements] : no involuntary movements were seen [No Muscle Atrophy] : normal bulk in all four extremities [Sensation Tactile Decrease] : light touch was intact [Balance] : balance was intact [Sclera] : the sclera and conjunctiva were normal [Extraocular Movements] : extraocular movements were intact [Edema] : there was no peripheral edema [Respiration, Rhythm And Depth] : normal respiratory rhythm and effort [Skin Color & Pigmentation] : normal skin color and pigmentation [Naming Objects] : difficulty naming common objects [Fluency] : fluency not intact [Past-pointing] : there was no past-pointing [Tremor] : no tremor present [Coordination - Dysmetria Impaired Finger-to-Nose Bilateral] : not present [Dysdiadochokinesia Bilaterally] : not present [FreeTextEntry5] : baseline [facility] [FreeTextEntry4] : Bilateral grasp response is present. [FreeTextEntry8] : gait steady, small steps

## 2019-12-09 NOTE — DISCUSSION/SUMMARY
[FreeTextEntry1] : Brain tumor - He is generally stable since last visit, although he is still frustrated by the changes in his intellect and speech. He is remaining active and engaging in social interactions. He will continue Avastin infusions q2 weeks; he is tolerating well. \par \par READING ISSUES -- He could read normally short, factual sentences. his complaints of difficulty reading the paper are likely more executive dysfunction in nature. I note the new bilateral grasp, but perhaps this is unrelated to the tumor. 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 Follow-up visit with MRI in January.

## 2019-12-09 NOTE — HISTORY OF PRESENT ILLNESS
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 He was evaluated by Dr Chang (Neurosurgery) while in house and no surgical intervention was felt to be appropriate.\par \par Currently on Avastin infusions q2 weeks, first infusion was 9/30/19. \par \par He presents today for routine follow up prior to his next Avastin infusion, accompanied by his wife,  daughter, and caregiver. \par \par He is tolerating the Avastin infusions well. \par \par His speech is slightly worse over the last few weeks. He is frustrated by his speech difficulties and lack of comprehension. However, he has resumed attending his regular meetings/talks and card games. \par \par Off Keppra with no seizures or seizure-like symptoms. \par \par Off decadron. No headaches, nausea/vomiting, weakness, visual changes. \par Gait is steady. No falls. \par \par Constipation resolved. \par \par They have HHA on a daily basis. \par

## 2020-01-01 ENCOUNTER — APPOINTMENT (OUTPATIENT)
Dept: INFUSION THERAPY | Facility: HOSPITAL | Age: 85
End: 2020-01-01

## 2020-01-01 ENCOUNTER — RESULT REVIEW (OUTPATIENT)
Age: 85
End: 2020-01-01

## 2020-01-01 ENCOUNTER — APPOINTMENT (OUTPATIENT)
Dept: NEUROLOGY | Facility: CLINIC | Age: 85
End: 2020-01-01
Payer: MEDICARE

## 2020-01-01 ENCOUNTER — APPOINTMENT (OUTPATIENT)
Dept: GERIATRICS | Facility: CLINIC | Age: 85
End: 2020-01-01
Payer: MEDICARE

## 2020-01-01 ENCOUNTER — APPOINTMENT (OUTPATIENT)
Dept: MRI IMAGING | Facility: IMAGING CENTER | Age: 85
End: 2020-01-01
Payer: MEDICARE

## 2020-01-01 ENCOUNTER — LABORATORY RESULT (OUTPATIENT)
Age: 85
End: 2020-01-01

## 2020-01-01 ENCOUNTER — OUTPATIENT (OUTPATIENT)
Dept: OUTPATIENT SERVICES | Facility: HOSPITAL | Age: 85
LOS: 1 days | End: 2020-01-01
Payer: MEDICARE

## 2020-01-01 VITALS
BODY MASS INDEX: 5.4 KG/M2 | SYSTOLIC BLOOD PRESSURE: 110 MMHG | TEMPERATURE: 97.3 F | RESPIRATION RATE: 16 BRPM | WEIGHT: 28.6 LBS | DIASTOLIC BLOOD PRESSURE: 58 MMHG | OXYGEN SATURATION: 98 % | HEART RATE: 96 BPM | HEIGHT: 61 IN

## 2020-01-01 VITALS
SYSTOLIC BLOOD PRESSURE: 92 MMHG | OXYGEN SATURATION: 96 % | HEART RATE: 96 BPM | DIASTOLIC BLOOD PRESSURE: 64 MMHG | RESPIRATION RATE: 16 BRPM

## 2020-01-01 DIAGNOSIS — R13.10 DYSPHAGIA, UNSPECIFIED: ICD-10-CM

## 2020-01-01 DIAGNOSIS — C71.9 MALIGNANT NEOPLASM OF BRAIN, UNSPECIFIED: ICD-10-CM

## 2020-01-01 DIAGNOSIS — Z71.89 OTHER SPECIFIED COUNSELING: ICD-10-CM

## 2020-01-01 DIAGNOSIS — Z90.89 ACQUIRED ABSENCE OF OTHER ORGANS: Chronic | ICD-10-CM

## 2020-01-01 DIAGNOSIS — Z51.11 ENCOUNTER FOR ANTINEOPLASTIC CHEMOTHERAPY: ICD-10-CM

## 2020-01-01 DIAGNOSIS — R26.81 UNSTEADINESS ON FEET: ICD-10-CM

## 2020-01-01 DIAGNOSIS — Z90.49 ACQUIRED ABSENCE OF OTHER SPECIFIED PARTS OF DIGESTIVE TRACT: Chronic | ICD-10-CM

## 2020-01-01 DIAGNOSIS — E03.9 HYPOTHYROIDISM, UNSPECIFIED: ICD-10-CM

## 2020-01-01 DIAGNOSIS — G93.89 OTHER SPECIFIED DISORDERS OF BRAIN: ICD-10-CM

## 2020-01-01 LAB
BASOPHILS # BLD AUTO: 0 K/UL — SIGNIFICANT CHANGE UP (ref 0–0.2)
BASOPHILS NFR BLD AUTO: 0.2 % — SIGNIFICANT CHANGE UP (ref 0–2)
EOSINOPHIL # BLD AUTO: 0 K/UL — SIGNIFICANT CHANGE UP (ref 0–0.5)
EOSINOPHIL NFR BLD AUTO: 0 % — SIGNIFICANT CHANGE UP (ref 0–6)
HCT VFR BLD CALC: 41.9 % — SIGNIFICANT CHANGE UP (ref 39–50)
HGB BLD-MCNC: 14.2 G/DL — SIGNIFICANT CHANGE UP (ref 13–17)
LYMPHOCYTES # BLD AUTO: 1.2 K/UL — SIGNIFICANT CHANGE UP (ref 1–3.3)
LYMPHOCYTES # BLD AUTO: 15.1 % — SIGNIFICANT CHANGE UP (ref 13–44)
MCHC RBC-ENTMCNC: 32.7 PG — SIGNIFICANT CHANGE UP (ref 27–34)
MCHC RBC-ENTMCNC: 34 G/DL — SIGNIFICANT CHANGE UP (ref 32–36)
MCV RBC AUTO: 96.3 FL — SIGNIFICANT CHANGE UP (ref 80–100)
MONOCYTES # BLD AUTO: 0.9 K/UL — SIGNIFICANT CHANGE UP (ref 0–0.9)
MONOCYTES NFR BLD AUTO: 12.1 % — SIGNIFICANT CHANGE UP (ref 2–14)
NEUTROPHILS # BLD AUTO: 5.7 K/UL — SIGNIFICANT CHANGE UP (ref 1.8–7.4)
NEUTROPHILS NFR BLD AUTO: 72.6 % — SIGNIFICANT CHANGE UP (ref 43–77)
PLATELET # BLD AUTO: 221 K/UL — SIGNIFICANT CHANGE UP (ref 150–400)
RBC # BLD: 4.35 M/UL — SIGNIFICANT CHANGE UP (ref 4.2–5.8)
RBC # FLD: 12.1 % — SIGNIFICANT CHANGE UP (ref 10.3–14.5)
WBC # BLD: 7.8 K/UL — SIGNIFICANT CHANGE UP (ref 3.8–10.5)
WBC # FLD AUTO: 7.8 K/UL — SIGNIFICANT CHANGE UP (ref 3.8–10.5)

## 2020-01-01 PROCEDURE — 70553 MRI BRAIN STEM W/O & W/DYE: CPT

## 2020-01-01 PROCEDURE — 99214 OFFICE O/P EST MOD 30 MIN: CPT

## 2020-01-01 PROCEDURE — A9585: CPT

## 2020-01-01 PROCEDURE — 99215 OFFICE O/P EST HI 40 MIN: CPT

## 2020-01-01 PROCEDURE — 70553 MRI BRAIN STEM W/O & W/DYE: CPT | Mod: 26

## 2020-01-01 RX ORDER — OYSTER SHELL CALCIUM WITH VITAMIN D 500; 200 MG/1; [IU]/1
500-200 TABLET, FILM COATED ORAL
Refills: 0 | Status: DISCONTINUED | COMMUNITY
Start: 2018-02-12 | End: 2020-01-01

## 2020-01-01 RX ORDER — ZOSTER VACCINE RECOMBINANT, ADJUVANTED 50 MCG/0.5
50 KIT INTRAMUSCULAR
Qty: 1 | Refills: 0 | Status: DISCONTINUED | COMMUNITY
Start: 2018-12-26 | End: 2020-01-01

## 2020-01-01 RX ORDER — MULTIVITAMIN
TABLET ORAL DAILY
Qty: 30 | Refills: 0 | Status: DISCONTINUED | OUTPATIENT
Start: 2018-02-12 | End: 2020-01-01

## 2020-01-01 RX ORDER — ATORVASTATIN CALCIUM 10 MG/1
10 TABLET, FILM COATED ORAL
Qty: 90 | Refills: 3 | Status: DISCONTINUED | COMMUNITY
Start: 1900-01-01 | End: 2020-01-01

## 2020-01-01 RX ORDER — ONDANSETRON 4 MG/1
4 TABLET ORAL
Qty: 60 | Refills: 6 | Status: DISCONTINUED | COMMUNITY
Start: 2019-01-01 | End: 2020-01-01

## 2020-01-06 NOTE — REASON FOR VISIT
[Spouse] : spouse [Follow-Up: _____] : a [unfilled] follow-up visit [Family Member] : family member [Formal Caregiver] : formal caregiver [FreeTextEntry1] : brain tumor

## 2020-01-06 NOTE — DATA REVIEWED
[de-identified] : I personally reviewed MR imaging dated\par 1/2/2020	11/4/19	8/11/19	\par \par In reviewing these images, I find INTERVAL STABILITY OF THE LEFT ANTERIOR LATERAL TEMPORAL HYPERINTENSITY on the T2 weighted images, with signal change likely representing an admixture of treatment effects, cerebral edema, or neoplasm. \par I am concerned about RISK FOR HERNIATION, BUT THE MIDBRAIN DOES NOT APPEAR IMPINGED.\par DWI imaging shows no acute CVA.\par On the contrast enhanced images, there is PROGRESSIVE INCREASE IN THE LEFT ANTERIOR TEMPORAL enhancement, WITHOUT EXTENSION TO OTHER LOBES.\par Overall I find the images to be c/w progressive glioma.\par Selected imaging was provided to the patient, along with a detailed verbal explanation of the issues at hand as outlined above.\par

## 2020-01-06 NOTE — DISCUSSION/SUMMARY
[FreeTextEntry1] : BRAIN TUMOR - There is progression of the enhancing disease despite stability of the T2 FLAIR changes. I am surprised the Avastin did not diminish the enhancement, but it clearly has had a beneficial effect on the cerebral edema. He will receive another treatment today. He is declining clinically and imaging demonstrates progression of disease. Avastin is no longer maintaining control of symptoms. As we previously discussed, he is not interested in prolonging his life. We discussed end of life issues and recommended hospice care. They agree and we will submit referral today. We assured our ongoing availability. \par \par PT EDUCATION – We discussed end of life and the anticipation that John would become sleepier and sleepier as the days and weeks progressed. Eventually, he would not be awake at all during the day. I think [facility] will provide valuable management of any suffering. I note that his current excellent home care assistant, Tiarra Rose ([phone]), also works with the home hospice team.\par \par DISPO - All questions answered. Follow-up as needed. \par \par

## 2020-01-06 NOTE — PHYSICAL EXAM
[General Appearance - In No Acute Distress] : in no acute distress [General Appearance - Alert] : alert [Affect] : the affect was normal [Person] : oriented to person [Place] : oriented to place [Visual Intact] : visual attention was ~T not ~L decreased [Comprehension] : comprehension intact [Cranial Nerves Optic (II)] : visual acuity intact bilaterally,  visual fields full to confrontation, pupils equal round and reactive to light [Cranial Nerves Oculomotor (III)] : extraocular motion intact [Cranial Nerves Glossopharyngeal (IX)] : tongue and palate midline [Cranial Nerves Trigeminal (V)] : facial sensation intact symmetrically [Motor Tone] : muscle tone was normal in all four extremities [Cranial Nerves Accessory (XI - Cranial And Spinal)] : head turning and shoulder shrug symmetric [Cranial Nerves Hypoglossal (XII)] : there was no tongue deviation with protrusion [Motor Strength] : muscle strength was normal in all four extremities [Involuntary Movements] : no involuntary movements were seen [No Muscle Atrophy] : normal bulk in all four extremities [Sensation Tactile Decrease] : light touch was intact [Limited Balance] : the patient's balance was impaired [Sclera] : the sclera and conjunctiva were normal [Extraocular Movements] : extraocular movements were intact [Respiration, Rhythm And Depth] : normal respiratory rhythm and effort [Skin Color & Pigmentation] : normal skin color and pigmentation [Edema] : there was no peripheral edema [Time] : disoriented to time [Span Intact] : the attention span was decreased [Fluency] : fluency not intact [Naming Objects] : difficulty naming common objects [Tremor] : no tremor present [Past-pointing] : there was no past-pointing [Dysdiadochokinesia Bilaterally] : not present [Coordination - Dysmetria Impaired Finger-to-Nose Bilateral] : not present [FreeTextEntry5] : baseline [place]. right facial droop.  [FreeTextEntry6] : b/l grasp

## 2020-01-06 NOTE — HISTORY OF PRESENT ILLNESS
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 ED 8/30/19 with complaints of slowed cognition and subtle difficulties with language (could not say “clover” or tomato.” Was treated with decadron 2mg twice daily and prophylactic keppra 500-500 and discharged home. He was evaluated by Dr Chang (Neurosurgery) while in house and no surgical intervention was felt to be appropriate.\par \par Currently on Avastin infusions q2 weeks, first infusion was 9/30/19. \par \par He presents today for routine follow up with new MRI prior to his next Avastin infusion, accompanied by his wife, daughter, and caregiver. \par \par He is tolerating the Avastin infusions well. \par \par His speech continues to decline over the past month. His daughter notes that he has declined physically over the last week - his gait is unsteady, he has spatial difficulty, and trouble performing everyday tasks. He has difficulty with articulation and sometimes chewing/swallowing. He has good days and bad days, and his condition fluctuates throughout the day. He does better when he is well rested. \par \par Off Keppra with no seizures or seizure-like symptoms. \par \par \par

## 2020-01-07 PROBLEM — R26.81 GAIT INSTABILITY: Status: ACTIVE | Noted: 2018-02-12

## 2020-01-07 PROBLEM — E03.9 HYPOTHYROIDISM: Status: ACTIVE | Noted: 2018-03-02

## 2020-01-07 PROBLEM — Z71.89 ADVANCE CARE PLANNING: Status: ACTIVE | Noted: 2018-02-12

## 2020-01-07 PROBLEM — C71.9 GLIOBLASTOMA MULTIFORME OF BRAIN: Status: ACTIVE | Noted: 2019-01-01

## 2020-01-07 PROBLEM — R13.10 DYSPHAGIA: Status: ACTIVE | Noted: 2020-01-01

## 2020-02-07 NOTE — DISCHARGE NOTE PROVIDER - NSDCCAREPROVSEEN_GEN_ALL_CORE_FT
[facility] Medicine, 5M Care Model Team [place]as
Samuel-9 years to 21 years...

## 2020-02-18 ENCOUNTER — APPOINTMENT (OUTPATIENT)
Dept: GERIATRICS | Facility: CLINIC | Age: 85
End: 2020-02-18

## 2021-06-08 NOTE — ED ADULT TRIAGE NOTE - NS ED NURSE BANDS TYPE
Name band; Coronary artery disease involving native coronary artery of native heart without angina pectoris

## 2021-09-07 NOTE — ED ADULT TRIAGE NOTE - ARRIVAL INFO ADDITIONAL COMMENTS
Neuro: Alert and Oriented X 4  Respiratory: CTA b/l. no respiratory distress  Cardiovascular: NSR, RRR  Gastrointestinal: soft, NT/ND  Extremities: (-) edema b/l
no signs of allergic reaction at triage

## 2023-10-16 NOTE — ED ADULT NURSE NOTE - NS ED NURSE RECORD ANOTHER VITAL SIGN
How Severe Are Your Hives?: moderate Is This A New Presentation, Or A Follow-Up?: Hives Additional History: Pt was prescribed hydrocortisone cream by PCP and has been using that to treat. Yes

## 2024-01-31 NOTE — ED ADULT NURSE NOTE - NS ED NURSE LEVEL OF CONSCIOUSNESS ORIENTATION
Anne Montoya  [facility]  CARDIOLOGY [address]  Scheduled Appointment: 02/02/2024     Oriented - self; Oriented - place; Oriented - time

## 2024-08-16 NOTE — PATIENT PROFILE ADULT - NSPROMUTANXFEARADDRESSFT_GEN_A_NUR
PRN Trazodone 50 mg given PO at 0021 for insomnia. Patient is still awake and unable to sleep PRN ineffective.   none